# Patient Record
Sex: FEMALE | Race: WHITE | NOT HISPANIC OR LATINO | Employment: STUDENT | URBAN - METROPOLITAN AREA
[De-identification: names, ages, dates, MRNs, and addresses within clinical notes are randomized per-mention and may not be internally consistent; named-entity substitution may affect disease eponyms.]

---

## 2018-09-20 ENCOUNTER — HOSPITAL ENCOUNTER (EMERGENCY)
Facility: HOSPITAL | Age: 23
Discharge: HOME | End: 2018-09-20
Attending: EMERGENCY MEDICINE
Payer: COMMERCIAL

## 2018-09-20 VITALS
SYSTOLIC BLOOD PRESSURE: 157 MMHG | OXYGEN SATURATION: 98 % | BODY MASS INDEX: 45.31 KG/M2 | RESPIRATION RATE: 20 BRPM | HEIGHT: 61 IN | WEIGHT: 240 LBS | HEART RATE: 82 BPM | TEMPERATURE: 98 F | DIASTOLIC BLOOD PRESSURE: 74 MMHG

## 2018-09-20 DIAGNOSIS — T50.901A MEDICATION OVERDOSE, ACCIDENTAL OR UNINTENTIONAL, INITIAL ENCOUNTER: Primary | ICD-10-CM

## 2018-09-20 PROCEDURE — 99283 EMERGENCY DEPT VISIT LOW MDM: CPT

## 2018-09-20 NOTE — DISCHARGE INSTRUCTIONS
"Return to the Emergency Department of any acute worsening of your symptoms or for any other concern.     You should return to the ED for fever/chills, shortness of breath, chest pain, weakness or "passing out".     Pt should take all medications as prescribed.    Pt should follow up with PCP as soon as possible.    The risks associated with not taking your medications as prescribed and not following up with your Primary Care doctor or sub specialist includes worsening of your condition, pain, disability, loss of function or livelihood, and death      VALDO Pressley M.D. 2:36 AM 9/20/2018      Our goal in the emergency department is to always give you outstanding care and exceptional service. You may receive a survey by mail or e-mail in the next week regarding your experience in our ED. We would greatly appreciate your completing and returning the survey. Your feedback provides us with a way to recognize our staff who give very good care and it helps us learn how to improve when your experience was below our aspiration of excellence.     "

## 2018-09-20 NOTE — ED PROVIDER NOTES
"Encounter Date: 9/20/2018       History     Chief Complaint   Patient presents with    Ingestion     Pt reports "Accidentally taking her night time medications twice last night".  Seroquel 400mg, lamictal 300mg, and lithium 1200mg.  States she took the first dose about 2300, then the second dose at 0100.     Patient states that she has had recent medication changes to her Seroquel Lamictal and lithium and that she accidentally took a double dose this evening.  She does not support any symptoms other than just not knowing if this would be okay or not.  She denies any shortness of breath, wheezing, lightheadedness, dizziness, chest pain, skin rashes, giddiness or excitability.          Review of patient's allergies indicates:   Allergen Reactions    Abilify [aripiprazole]      Past Medical History:   Diagnosis Date    Mental disorder      No past surgical history on file.  Family History   Problem Relation Age of Onset    Breast cancer Maternal Aunt     Colon cancer Neg Hx     Ovarian cancer Neg Hx      Social History     Tobacco Use    Smoking status: Current Every Day Smoker     Packs/day: 0.50     Years: 4.00     Pack years: 2.00     Types: Cigarettes   Substance Use Topics    Alcohol use: Yes    Drug use: Not on file     Review of Systems   Constitutional: Negative for activity change, appetite change, chills, diaphoresis, fatigue and fever.   HENT: Negative for sore throat and trouble swallowing.    Respiratory: Negative for apnea, cough, chest tightness and shortness of breath.    Cardiovascular: Negative for chest pain and leg swelling.   Gastrointestinal: Negative for abdominal pain.   Musculoskeletal: Negative for arthralgias.   Neurological: Negative for dizziness, tremors, seizures, syncope, speech difficulty, weakness, light-headedness, numbness and headaches.   Psychiatric/Behavioral: Negative for agitation, behavioral problems, confusion, dysphoric mood, hallucinations and self-injury. The " patient is not nervous/anxious and is not hyperactive.        Physical Exam     Initial Vitals [09/20/18 0225]   BP Pulse Resp Temp SpO2   (!) 157/74 82 20 98.3 °F (36.8 °C) 98 %      MAP       --         Physical Exam    Constitutional: She appears well-developed and well-nourished. No distress.   HENT:   Head: Normocephalic and atraumatic.   Eyes: Conjunctivae and EOM are normal. Pupils are equal, round, and reactive to light.   Neck: Normal range of motion.   Cardiovascular: Regular rhythm and intact distal pulses.   Pulmonary/Chest: Breath sounds normal. No respiratory distress.   Abdominal: She exhibits no distension. There is no tenderness.   Musculoskeletal: She exhibits no edema or tenderness.   Neurological: She is alert and oriented to person, place, and time. She has normal strength.   Skin: Skin is warm and dry. Capillary refill takes less than 2 seconds.   Psychiatric: She has a normal mood and affect. Thought content normal.         ED Course   Procedures  Labs Reviewed   POCT URINE PREGNANCY          Imaging Results    None          Medical Decision Making:   History:   Old Medical Records: I decided to obtain old medical records.  Initial Assessment:   Medical decision-making:    The patient received a medical screening exam. If performed, the EKG was independently evaluated by me and is pending final cardiology evaluation.  If performed, all radiographic studies were independently evaluated by me and are pending final radiology evaluation. If labs were ordered, they were reviewed. Vital signs are independently assessed by me.  If performed, the pulse oximetry was independently evaluated by me.  I decided to obtain the patient's past medical record.  If available, I reviewed the patient's past medical record, including most recent labs and radiology reports.    ED Management:  Patient presents to the emergency department after taking an accidental extra dose of her nightly medications.  I discussed  all of the above findings with poison Control and we cross reference this with the therapeutic range values of all of her medications and and does not appear that she is at any risk for developing any toxic levels.  Patient is not showing any signs of anaphylaxis or an acute allergic reaction.  She is completely symptom free.  Advised her to hold her medications and does restart them an a day.  Return precautions were discussed.  Signs and symptoms of allergic reactions and anaphylaxis were discussed-though she is not experiencing any of these and I doubt that she will given the therapeutic range values and the doses that she took.    The results and physical exam findings were reviewed with the patient. Pt agrees with assessment, disposition and treatment plan and has no further questions or complaints at this time.    VALDO Pressley M.D. 2:40 AM 9/20/2018                        Clinical Impression:   The encounter diagnosis was Medication overdose, accidental or unintentional, initial encounter.                             Kevin Pressley MD  09/20/18 0240

## 2020-01-17 ENCOUNTER — HOSPITAL ENCOUNTER (EMERGENCY)
Facility: HOSPITAL | Age: 25
Discharge: HOME | End: 2020-01-17
Attending: EMERGENCY MEDICINE
Payer: MEDICAID

## 2020-01-17 VITALS
WEIGHT: 270 LBS | RESPIRATION RATE: 18 BRPM | BODY MASS INDEX: 50.98 KG/M2 | SYSTOLIC BLOOD PRESSURE: 138 MMHG | OXYGEN SATURATION: 96 % | TEMPERATURE: 98 F | DIASTOLIC BLOOD PRESSURE: 81 MMHG | HEART RATE: 78 BPM | HEIGHT: 61 IN

## 2020-01-17 DIAGNOSIS — F32.A DEPRESSION, UNSPECIFIED DEPRESSION TYPE: Primary | ICD-10-CM

## 2020-01-17 DIAGNOSIS — F31.9 BIPOLAR AFFECTIVE DISORDER, REMISSION STATUS UNSPECIFIED: ICD-10-CM

## 2020-01-17 DIAGNOSIS — R63.0 DECREASED APPETITE: ICD-10-CM

## 2020-01-17 PROCEDURE — 99283 EMERGENCY DEPT VISIT LOW MDM: CPT

## 2020-01-17 NOTE — ED TRIAGE NOTES
"Patient states she stopped taking her lithium in October abruptly.  Her psychiatrist was weaning her Seroquel ER from 300mg to 150mg. Due to insurance denying the medication she has only been taking 150mg daily. Patient is tearful. States she hasn't needed to be hospitalized in over 6 years. Drove herself here because she doesn't have a plan to kill herself but doesn't feel "stable."   "

## 2020-01-17 NOTE — DISCHARGE INSTRUCTIONS
Please return to the ED immediately if you have thoughts of hurting or killing yourself. Call 911. This is very important. Please stay around family member and friends this weekend. See [facility] tomorrow if possible to discuss depression and possible restarting lithium. Please see your psychiatrist as soon as possible to discuss medication management.     Thank you for coming to our Emergency Department today. It is important to remember that some problems are difficult to diagnose and may not be found during your first visit. Be sure to follow up with your primary care doctor and review any labs/imaging that was performed with them. If you do not have a primary care doctor, you may contact the one listed on your discharge paperwork or you may also call the [facility] Appointment Desk at [phone] to schedule an appointment with one.     All medications may potentially have side effects and it is impossible to predict which medications may give you side effects. If you feel that you are having a negative effect of any medication you should immediately stop taking them and seek medical attention.    Return to the ER with any questions/concerns, new/concerning symptoms, worsening or failure to improve. Do not drive or make any important decisions for 24 hours if you have received any pain medications, sedatives or mood altering drugs during your ER visit.

## 2020-01-17 NOTE — ED PROVIDER NOTES
Encounter Date: 1/17/2020       History     Chief Complaint   Patient presents with    Psychiatric Evaluation     reports stop taking lithium, reports racing throughts for past week, cyring in triage, denies HI; reports taking multiple meds, weaning herself off seroquel     23 yo female with PMHx of Bipolar, anxiety presents with depression. Patient states she recently stopped taking lithium in October and then her psychiatrist (Dr. Robledo at [facility]) decreased her seroquel from 300 mg to 150 mg to wean her off to start her on a new medication. However her insurance is not covering the new medication and she has not been able to get it filled. She tried to make appt with psych today but they were not accepting walk-ins until Tuesday. Last saw her psychiatrist on Friday but started feeling worse after that. Patient report decreased sleep over the last few days, decreased appetite, racing thoughts at night. She reports feeling more depressed. However she adamantly denies SI/HI/AVH. Current med: Wellbutrin, Lamictal, Seroquel. Denies ETOH, illicits, reports 1/3 ppd tobacco. Allergies: abilify.         Review of patient's allergies indicates:   Allergen Reactions    Abilify [aripiprazole]      Past Medical History:   Diagnosis Date    Mental disorder      History reviewed. No pertinent surgical history.  Family History   Problem Relation Age of Onset    Breast cancer Maternal Aunt     Colon cancer Neg Hx     Ovarian cancer Neg Hx      Social History     Tobacco Use    Smoking status: Current Every Day Smoker     Packs/day: 0.25     Years: 4.00     Pack years: 1.00     Types: Cigarettes    Tobacco comment: 2 packs per week   Substance Use Topics    Alcohol use: Not Currently    Drug use: Not Currently     Review of Systems   Constitutional: Positive for fatigue. Negative for chills, diaphoresis and fever.   Eyes: Negative for photophobia and visual disturbance.   Respiratory: Negative for cough and  shortness of breath.    Cardiovascular: Negative for chest pain and leg swelling.   Gastrointestinal: Negative for abdominal pain, blood in stool, constipation, diarrhea, nausea and vomiting.   Genitourinary: Negative for dysuria, flank pain, frequency, hematuria and urgency.   Musculoskeletal: Negative for neck pain and neck stiffness.   Skin: Negative for rash and wound.   Neurological: Negative for weakness, light-headedness, numbness and headaches.   Psychiatric/Behavioral: Positive for dysphoric mood and sleep disturbance. Negative for agitation, behavioral problems, confusion, hallucinations, self-injury and suicidal ideas.   All other systems reviewed and are negative.      Physical Exam     Initial Vitals   BP Pulse Resp Temp SpO2   01/17/20 1530 01/17/20 1529 01/17/20 1530 01/17/20 1530 01/17/20 1530   (!) 172/83 110 16 98.8 °F (37.1 °C) 95 %      MAP       --                Physical Exam    Nursing note and vitals reviewed.  Constitutional: She appears well-developed and well-nourished. She is not diaphoretic. No distress.   HENT:   Head: Normocephalic and atraumatic.   Mouth/Throat: Oropharynx is clear and moist. No oropharyngeal exudate.   Eyes: Conjunctivae and EOM are normal. Pupils are equal, round, and reactive to light. Right eye exhibits no discharge. Left eye exhibits no discharge.   Neck: Normal range of motion. Neck supple. No JVD present.   Cardiovascular: Normal rate, regular rhythm, normal heart sounds and intact distal pulses. Exam reveals no gallop and no friction rub.    No murmur heard.  Pulmonary/Chest: Breath sounds normal. No respiratory distress. She has no wheezes. She has no rhonchi. She has no rales.   Abdominal: Soft. Bowel sounds are normal. She exhibits no distension. There is no tenderness. There is no rebound and no guarding.   Musculoskeletal: She exhibits no edema or tenderness.   Lymphadenopathy:     She has no cervical adenopathy.   Neurological: She is alert and oriented  to person, place, and time. No cranial nerve deficit or sensory deficit. GCS score is 15. GCS eye subscore is 4. GCS verbal subscore is 5. GCS motor subscore is 6.   Skin: Skin is warm and dry. Capillary refill takes less than 2 seconds.   Psychiatric: Her behavior is normal. Judgment and thought content normal.   Slightly tearful         ED Course   Procedures  Labs Reviewed - No data to display       Imaging Results    None     MDM  23 yo female presents to ED for increased feelings of depression over the last few days. Denies SI, HI, AVH. Patient is not gravely disabled and is meeting all her ADLS.Patient does not meet PEC criteria. She appears goal oriented and able to have thoughtful conversation about options to improve mood. Discussed with patient options including lab work to look for medical cause, hydroxyzine for anxiety and sleep, voluntary admission to psychiatric facility. Patient states she will call to get appt with psych on Tuesday and try to see [facility] tomorrow. She is going to stay with her sister this morning has a safety plan to return to the ED if she develops any thoughts of hurting herself. She reports recently having labs and thyroid checked and does not think it is a metabolic cause. Discussed with her meditation, keeping socially engaged, exercise, and other nonpharmacologic options for mood improvement. Patient in agreement with plan. Offered to talk to family to discuss patient case, however she is declining at this time as she states they will just give her more anxiety. All questions answered and patient in agreement with plan.                                      Clinical Impression:       ICD-10-CM ICD-9-CM   1. Depression, unspecified depression type F32.9 311   2. Bipolar affective disorder, remission status unspecified F31.9 296.80   3. Decreased appetite R63.0 783.0                             Milagros Chiang MD  01/17/20 8193

## 2020-04-05 ENCOUNTER — HOSPITAL ENCOUNTER (EMERGENCY)
Facility: HOSPITAL | Age: 25
Discharge: HOME | End: 2020-04-05
Attending: EMERGENCY MEDICINE
Payer: MEDICAID

## 2020-04-05 VITALS
OXYGEN SATURATION: 96 % | HEART RATE: 90 BPM | SYSTOLIC BLOOD PRESSURE: 131 MMHG | DIASTOLIC BLOOD PRESSURE: 79 MMHG | WEIGHT: 280 LBS | BODY MASS INDEX: 52.91 KG/M2 | RESPIRATION RATE: 18 BRPM | TEMPERATURE: 99 F

## 2020-04-05 DIAGNOSIS — R05.9 COUGH: ICD-10-CM

## 2020-04-05 DIAGNOSIS — J06.9 VIRAL URI WITH COUGH: Primary | ICD-10-CM

## 2020-04-05 LAB
B-HCG UR QL: NEGATIVE
CTP QC/QA: YES
CTP QC/QA: YES
POC MOLECULAR INFLUENZA A AGN: NEGATIVE
POC MOLECULAR INFLUENZA B AGN: NEGATIVE
SARS-COV-2 RNA AMPLIFICATION, QUAL: NEGATIVE

## 2020-04-05 PROCEDURE — 87502 INFLUENZA DNA AMP PROBE: CPT

## 2020-04-05 PROCEDURE — 99284 EMERGENCY DEPT VISIT MOD MDM: CPT | Mod: 25

## 2020-04-05 PROCEDURE — 81025 URINE PREGNANCY TEST: CPT | Performed by: PHYSICIAN ASSISTANT

## 2020-04-05 PROCEDURE — U0002 COVID-19 LAB TEST NON-CDC: HCPCS

## 2020-04-05 PROCEDURE — 25000003 PHARM REV CODE 250: Performed by: PHYSICIAN ASSISTANT

## 2020-04-05 RX ORDER — ALBUTEROL SULFATE 90 UG/1
1-2 AEROSOL, METERED RESPIRATORY (INHALATION) EVERY 6 HOURS PRN
Qty: 18 G | Refills: 0 | Status: SHIPPED | OUTPATIENT
Start: 2020-04-05 | End: 2020-05-05

## 2020-04-05 RX ORDER — BENZONATATE 100 MG/1
100 CAPSULE ORAL 3 TIMES DAILY PRN
Qty: 20 CAPSULE | Refills: 0 | Status: SHIPPED | OUTPATIENT
Start: 2020-04-05 | End: 2020-04-12

## 2020-04-05 RX ORDER — IBUPROFEN 600 MG/1
600 TABLET ORAL EVERY 6 HOURS PRN
Qty: 20 TABLET | Refills: 0 | Status: SHIPPED | OUTPATIENT
Start: 2020-04-05 | End: 2020-04-10

## 2020-04-05 RX ORDER — ACETAMINOPHEN 500 MG
500 TABLET ORAL
Status: COMPLETED | OUTPATIENT
Start: 2020-04-05 | End: 2020-04-05

## 2020-04-05 RX ORDER — ACETAMINOPHEN 500 MG
500 TABLET ORAL EVERY 4 HOURS PRN
Qty: 20 TABLET | Refills: 0 | Status: SHIPPED | OUTPATIENT
Start: 2020-04-05 | End: 2020-04-10

## 2020-04-05 RX ORDER — BENZONATATE 100 MG/1
100 CAPSULE ORAL
Status: COMPLETED | OUTPATIENT
Start: 2020-04-05 | End: 2020-04-05

## 2020-04-05 RX ADMIN — ACETAMINOPHEN 500 MG: 500 TABLET ORAL at 04:04

## 2020-04-05 RX ADMIN — BENZONATATE 100 MG: 100 CAPSULE ORAL at 04:04

## 2020-04-05 NOTE — ED PROVIDER NOTES
Encounter Date: 4/5/2020       History     Chief Complaint   Patient presents with    Cough     c/o cough x a week, was seen by tele health and presumtive positve for COVID. pt denies fever. Pt endorses diarrhea. pt states streaks of blood in sputum.      CC:Cough    HPI:   25 y/o female 4 day history of dry cough, subjective fever, chills. She reports associated intermittent SOB for the past 4 days worse in the evenings and with waking in the morning, worse with exertion.  Began seeing specks of blood in sputum today. Sister is presumptive positive for COVID-19 who she was in contact with twice this week. She was seen by telehealth and was referred to get a test but was unable to at that time because she didn't have a fever. Was sent here by  for testing. Pt has had diarrhea 3-4 x/day for the past 6 days. Had HA and neck pain 4 days ago that resolved. Denies nasal congestion, rhinorrhea, nausea, vomiting, abdominal pain, dysuria, urinary frequency or urgency, melena, hematochezia, lower extremity swelling, recent travel trauma surgery, history of DVT or PE, hemoptysis, history of cancer. Attempted tx with Mucinex and Tylenol         Review of patient's allergies indicates:   Allergen Reactions    Abilify [aripiprazole]      Past Medical History:   Diagnosis Date    Mental disorder      History reviewed. No pertinent surgical history.  Family History   Problem Relation Age of Onset    Breast cancer Maternal Aunt     Colon cancer Neg Hx     Ovarian cancer Neg Hx      Social History     Tobacco Use    Smoking status: Current Every Day Smoker     Packs/day: 0.25     Years: 4.00     Pack years: 1.00     Types: Cigarettes    Tobacco comment: 2 packs per week   Substance Use Topics    Alcohol use: Not Currently    Drug use: Not Currently     Review of Systems   Constitutional: Positive for chills and fever.   HENT: Negative for congestion, ear pain, rhinorrhea and sore throat.    Eyes: Negative for redness.    Respiratory: Positive for cough and shortness of breath.    Cardiovascular: Negative for chest pain.   Gastrointestinal: Positive for diarrhea. Negative for abdominal pain, nausea and vomiting.   Genitourinary: Negative for dysuria, frequency and urgency.   Musculoskeletal: Positive for neck pain. Negative for back pain and neck stiffness.   Skin: Negative for rash.   Neurological: Positive for headaches. Negative for speech difficulty.   Psychiatric/Behavioral: Negative for confusion.       Physical Exam     Initial Vitals [04/05/20 1538]   BP Pulse Resp Temp SpO2   138/81 90 19 98.4 °F (36.9 °C) 96 %      MAP       --         Physical Exam    Nursing note and vitals reviewed.  Constitutional: She appears well-developed and well-nourished.   HENT:   Head: Normocephalic.   Right Ear: External ear normal.   Left Ear: External ear normal.   Nose: Nose normal.   Eyes: Conjunctivae are normal.   Cardiovascular: Normal rate and regular rhythm. Exam reveals no gallop and no friction rub.    No murmur heard.  Pulmonary/Chest: Breath sounds normal. She has no wheezes. She has no rhonchi. She has no rales.   Abdominal: Soft. Bowel sounds are normal. She exhibits no distension. There is no tenderness. There is no rebound, no guarding, no tenderness at McBurney's point and negative Shine's sign.   Musculoskeletal: Normal range of motion.   Neurological: She is alert. She has normal strength. No cranial nerve deficit or sensory deficit.   Skin: Skin is warm and dry.   No lower extremity pain or swelling   Psychiatric: She has a normal mood and affect.         ED Course   Procedures  Labs Reviewed   SARS-COV-2 RNA AMPLIFICATION, QUAL   POCT URINE PREGNANCY   POCT INFLUENZA A/B MOLECULAR          Imaging Results          X-Ray Chest AP Portable (Final result)  Result time 04/05/20 16:24:31    Final result by Arsen Mac MD (04/05/20 16:24:31)                 Impression:      1. No acute cardiopulmonary  process.      Electronically signed by: Arsen Mac MD  Date:    04/05/2020  Time:    16:24             Narrative:    EXAMINATION:  XR CHEST AP PORTABLE    CLINICAL HISTORY:  Cough    TECHNIQUE:  Single frontal view of the chest was performed.    COMPARISON:  None    FINDINGS:  The cardiomediastinal silhouette is not enlarged.  There is no pleural effusion.  The trachea is midline.  The lungs are symmetrically expanded bilaterally without evidence of acute parenchymal process. No large focal consolidation seen.  There is no pneumothorax.  The osseous structures are unremarkable.                                 Medical Decision Making:   Initial Assessment:   [age] year female no pertinent past medical history presenting for evaluation of dry cough with associated shortness of breath for the past 4 days.  Patient reports associated diarrhea for the past 6 days.  Has been in contact with someone who tested positive for COVID19.  Pt is afebrile, nontoxic appearing in no distress. Exam above.  UPT negative.  Influenza and COVID swabs are negative.  Chest x-ray negative for PNA pneumothorax acute abnormality.  She is perc negative. Think this is viral syndrome. Will discharge with meds for symptomatic treatment and instructed to use humidifier. Follow up with primary care. Return to ER for worsening symptoms or as needed    Additional MDM:     Well's Criteria Score:  -Clinical symptoms of DVT (leg swelling, pain with palpation) = 0.0  -Other diagnosis less likely than pulmonary embolism =            0.0  -Heart Rate >100 =   0.0  -Immobilization (= or > than 3 days) or surgery in the previous 4 weeks = 0.0  -Previous DVT/PE = 0.0  -Hemoptysis =          0.0                                      Clinical Impression:       ICD-10-CM ICD-9-CM   1. Viral URI with cough J06.9 465.9    B97.89    2. Cough R05 786.2             ED Disposition Condition    Discharge Stable        ED Prescriptions     Medication Sig  Dispense Start Date End Date Auth. Provider    ibuprofen (ADVIL,MOTRIN) 600 MG tablet Take 1 tablet (600 mg total) by mouth every 6 (six) hours as needed for Pain. 20 tablet 4/5/2020 4/10/2020 Sofía Askew PA-C    acetaminophen (TYLENOL) 500 MG tablet Take 1 tablet (500 mg total) by mouth every 4 (four) hours as needed. 20 tablet 4/5/2020 4/10/2020 Sofía Askew PA-C    benzonatate (TESSALON) 100 MG capsule Take 1 capsule (100 mg total) by mouth 3 (three) times daily as needed for Cough. 20 capsule 4/5/2020 4/12/2020 Sofía Askew PA-C    albuterol (PROVENTIL/VENTOLIN HFA) 90 mcg/actuation inhaler Inhale 1-2 puffs into the lungs every 6 (six) hours as needed for Shortness of Breath. Rescue 18 g 4/5/2020 5/5/2020 Sofía Askew PA-C        Follow-up Information     Follow up With Specialties Details Why Contact [facility]  Schedule an appointment as soon as possible for a visit   [address]  [place] [address]  [phone]      [facility]  Schedule an appointment as soon as possible for a visit   [address]  [facility] [address]  [phone]      Approved Provider By Your Insurance Company  Schedule an appointment as soon as possible for a visit in 2 days Call number on your insurance card to receive a full list of providers in your area     [facility] Emergency Medicine Go to  If symptoms worsen, As needed [address] [address] [phone]                                     Sofía Askew PA-C  04/05/20 2365

## 2020-04-05 NOTE — DISCHARGE INSTRUCTIONS
Take Ibuprofen and Tylenol for fever and pain. Take Tesaslon for cough. Use inhaler as needed for SOB. Follow up with primary care . Return to ER for worsening symptoms or as needed.

## 2020-04-09 ENCOUNTER — HOSPITAL ENCOUNTER (EMERGENCY)
Facility: HOSPITAL | Age: 25
Discharge: HOME | End: 2020-04-09
Attending: EMERGENCY MEDICINE
Payer: MEDICAID

## 2020-04-09 VITALS
RESPIRATION RATE: 18 BRPM | HEIGHT: 61 IN | DIASTOLIC BLOOD PRESSURE: 71 MMHG | TEMPERATURE: 99 F | WEIGHT: 275 LBS | OXYGEN SATURATION: 98 % | SYSTOLIC BLOOD PRESSURE: 132 MMHG | HEART RATE: 82 BPM | BODY MASS INDEX: 51.92 KG/M2

## 2020-04-09 DIAGNOSIS — R04.2 HEMOPTYSIS: Primary | ICD-10-CM

## 2020-04-09 LAB
ANION GAP SERPL CALC-SCNC: 9 MMOL/L (ref 8–16)
B-HCG UR QL: NEGATIVE
BASOPHILS # BLD AUTO: 0.04 K/UL (ref 0–0.2)
BASOPHILS NFR BLD: 0.4 % (ref 0–1.9)
BUN SERPL-MCNC: 7 MG/DL (ref 6–20)
CALCIUM SERPL-MCNC: 9.5 MG/DL (ref 8.7–10.5)
CHLORIDE SERPL-SCNC: 106 MMOL/L (ref 95–110)
CO2 SERPL-SCNC: 24 MMOL/L (ref 23–29)
CREAT SERPL-MCNC: 0.9 MG/DL (ref 0.5–1.4)
CTP QC/QA: YES
DIFFERENTIAL METHOD: NORMAL
EOSINOPHIL # BLD AUTO: 0.2 K/UL (ref 0–0.5)
EOSINOPHIL NFR BLD: 2.4 % (ref 0–8)
ERYTHROCYTE [DISTWIDTH] IN BLOOD BY AUTOMATED COUNT: 13.2 % (ref 11.5–14.5)
EST. GFR  (AFRICAN AMERICAN): >60 ML/MIN/1.73 M^2
EST. GFR  (NON AFRICAN AMERICAN): >60 ML/MIN/1.73 M^2
GLUCOSE SERPL-MCNC: 112 MG/DL (ref 70–110)
HCT VFR BLD AUTO: 44.9 % (ref 37–48.5)
HGB BLD-MCNC: 14.6 G/DL (ref 12–16)
IMM GRANULOCYTES # BLD AUTO: 0.04 K/UL (ref 0–0.04)
IMM GRANULOCYTES NFR BLD AUTO: 0.4 % (ref 0–0.5)
INR PPP: 0.9 (ref 0.8–1.2)
LYMPHOCYTES # BLD AUTO: 3.7 K/UL (ref 1–4.8)
LYMPHOCYTES NFR BLD: 37.4 % (ref 18–48)
MCH RBC QN AUTO: 27.6 PG (ref 27–31)
MCHC RBC AUTO-ENTMCNC: 32.5 G/DL (ref 32–36)
MCV RBC AUTO: 85 FL (ref 82–98)
MONOCYTES # BLD AUTO: 0.4 K/UL (ref 0.3–1)
MONOCYTES NFR BLD: 4.1 % (ref 4–15)
NEUTROPHILS # BLD AUTO: 5.5 K/UL (ref 1.8–7.7)
NEUTROPHILS NFR BLD: 55.3 % (ref 38–73)
NRBC BLD-RTO: 0 /100 WBC
PLATELET # BLD AUTO: 290 K/UL (ref 150–350)
PMV BLD AUTO: 9.5 FL (ref 9.2–12.9)
POTASSIUM SERPL-SCNC: 3.8 MMOL/L (ref 3.5–5.1)
PROTHROMBIN TIME: 10.3 SEC (ref 9–12.5)
RBC # BLD AUTO: 5.29 M/UL (ref 4–5.4)
SODIUM SERPL-SCNC: 139 MMOL/L (ref 136–145)
WBC # BLD AUTO: 9.87 K/UL (ref 3.9–12.7)

## 2020-04-09 PROCEDURE — 85025 COMPLETE CBC W/AUTO DIFF WBC: CPT

## 2020-04-09 PROCEDURE — 99284 EMERGENCY DEPT VISIT MOD MDM: CPT | Mod: 25

## 2020-04-09 PROCEDURE — 85610 PROTHROMBIN TIME: CPT

## 2020-04-09 PROCEDURE — 81025 URINE PREGNANCY TEST: CPT | Performed by: PHYSICIAN ASSISTANT

## 2020-04-09 PROCEDURE — 80048 BASIC METABOLIC PNL TOTAL CA: CPT

## 2020-04-10 NOTE — DISCHARGE INSTRUCTIONS
Follow-up with your primary care provider for reevaluation of blood in sputum. Begin pepcid/famotidine twice daily.  Drink lots of fluids, stay well hydrated.    Immediately return to this emergency department if you begin with shortness of breath or chest pain, if you begin with uncontrolled fever, if the bleeding worsens, if you begin with lightheadedness dizziness, if any other problems occur.

## 2020-04-10 NOTE — ED TRIAGE NOTES
Pt presents to ED c/o cough for past couple weeks. Pt states she started coughing up streaks of blood approx one week ago.

## 2020-04-10 NOTE — ED PROVIDER NOTES
Encounter Date: 4/9/2020       History     Chief Complaint   Patient presents with    Hemoptysis     Seen here in this ED this past Sunday for same symptoms. Reports COVID screening negative. Pt reports she is still coughing up streaks of blood.     Cough     24-year-old female history of anxiety/depression, bipolar disorder, presents to ED with chief complaint infrequent streaks of bright hemoptysis x1.5 weeks.  She admits to cough x2 weeks.  She was recently seen in this ED on 04/05/2020 due to cough, shortness of breath.  COVID swab negative.  Patient states majority of her symptoms have improved.  She admits to very infrequent productive cough at this time.  She states today she coughed and noticed some streaks in her sputum.  She states she does not notice blood every time she coughs.  She denies fever.  She denies shortness of breath.  She denies dyspnea on exertion.  No lightheadedness or dizziness.  No history of anemia.  No fatigue or weakness.    She denies calf pain or leg swelling.  She is currently on OCP. No hx DVT/PE. No hematemesis, no melena or BRBPR. No hx platelet dysfunction. No antiplatelets or anticoagulation.    She does admit to mild, intermittent R midaxillary chest pain when lying supine at night. No pain throughout the day, no pain with deep inspiration, no pain with cough, no pain with movement or palpation.        Review of patient's allergies indicates:   Allergen Reactions    Abilify [aripiprazole]      Past Medical History:   Diagnosis Date    Mental disorder      History reviewed. No pertinent surgical history.  Family History   Problem Relation Age of Onset    Breast cancer Maternal Aunt     Colon cancer Neg Hx     Ovarian cancer Neg Hx      Social History     Tobacco Use    Smoking status: Current Every Day Smoker     Packs/day: 0.25     Years: 4.00     Pack years: 1.00     Types: Cigarettes    Smokeless tobacco: Never Used    Tobacco comment: 2 packs per week   Substance  Use Topics    Alcohol use: Not Currently    Drug use: Not Currently     Review of Systems   Constitutional: Negative for activity change, appetite change, chills and fever.   Respiratory: Positive for cough. Negative for chest tightness, shortness of breath and wheezing.         Hemoptysis   Cardiovascular: Negative for palpitations and leg swelling.        R midaxillary chest pain   Gastrointestinal: Negative for abdominal pain, nausea and vomiting.   Genitourinary: Negative for hematuria and vaginal bleeding.   Musculoskeletal: Negative for arthralgias.   Neurological: Negative for dizziness and light-headedness.       Physical Exam     Initial Vitals [04/09/20 1917]   BP Pulse Resp Temp SpO2   127/88 107 20 98.5 °F (36.9 °C) 97 %      MAP       --         Physical Exam    Nursing note and vitals reviewed.  Constitutional: She appears well-developed and well-nourished. She is not diaphoretic. No distress.   Overall well-appearing nontoxic.  Resting comfortably on exam table.  Speaking in full sentences without pause or difficulty.   HENT:   Head: Normocephalic and atraumatic.   Eyes: EOM are normal.   Neck: Normal range of motion. Neck supple.   Cardiovascular: Intact distal pulses.   Sinus tachycardia   Pulmonary/Chest: Breath sounds normal. No respiratory distress. She has no wheezes. She has no rhonchi. She exhibits no tenderness.   Neurological: She is alert and oriented to person, place, and time.   Skin: Skin is warm.   Psychiatric: She has a normal mood and affect. Thought content normal.         ED Course   Procedures  Labs Reviewed   BASIC METABOLIC PANEL - Abnormal; Notable for the following components:       Result Value    Glucose 112 (*)     All other components within normal limits   CBC W/ AUTO DIFFERENTIAL   PROTIME-INR   POCT URINE PREGNANCY          Imaging Results          X-Ray Chest 1 View (Final result)  Result time 04/09/20 19:41:53    Final result by Castro Louis MD (04/09/20  19:41:53)                 Impression:      No acute cardiopulmonary process identified.      Electronically signed by: Castro Louis MD  Date:    04/09/2020  Time:    19:41             Narrative:    EXAMINATION:  XR CHEST 1 VIEW    CLINICAL HISTORY:  Hemoptysis    TECHNIQUE:  Single frontal view of the chest was performed.    COMPARISON:  04/05/2020.    FINDINGS:  Cardiac silhouette is normal in size.  Lungs are symmetrically expanded.  No evidence of focal consolidative process, pneumothorax, or significant effusion.  No acute osseous abnormality identified.                              X-Rays:   Independently Interpreted Readings:   Chest X-Ray: No consolidation, effusion, pneumothorax.  Very mild increase in right-sided hilar vascularity.     Medical Decision Making:   Differential Diagnosis:   Bronchitis, bleeding disorder, anxiety, malignancy  Clinical Tests:   Lab Tests: Ordered and Reviewed  Radiological Study: Ordered and Reviewed  ED Management:  Hemoptysis is minimal, infrequent. No lightheadedness/dizziness, no hx anemia, no bleeding disorders. She is a smoker. +fibrocystic breast disease. No strong family hx malignancy. No SOB. No history of thrombophilia.  No recent surgery.  No major trauma. No recent immobility.  No active cancer. Patient is not pregnant.  This is not following the immediate postpartum interval if patient has been pregnant.  Patient is less than 50 years old.  No history of percutaneous indwelling catheter. No previous DVT/PE. She has presented to the ED with heart rate from the 90 to 110 in the past.  This may be patient norm. Despite exogenous estrogen, I do not suspect PE as culprit of infrequent, small volume hemoptysis at this time.    Basic labs reassuring; H and H within normal limits, normal platelet count, normal PT/INR, BMP without any disturbances. CXR without concerning findings or acute change from previous. I do not feel need for chest CT or any more imaging at this  time.     Will d/c with instructions to f/u with PCP for reevaluation of hemoptysis. I've asked her to return to this ED if volume increases, if she begins with signs of anemia, if she begins with SOB or worsening tachycardiac, if any other problems occur.                                  Clinical Impression:       ICD-10-CM ICD-9-CM   1. Hemoptysis R04.2 786.30         Disposition:   Disposition: Discharged  Condition: Stable     ED Disposition Condition    Discharge Stable        ED Prescriptions     None        Follow-up Information     Follow up With Specialties Details Why Contact Info    [facility] [place]  Schedule an appointment as soon as possible for a visit  for reevaluation [address] [place] [address]  [phone]      [facility]  As needed, If symptoms worsen [address] [facility] [address]  [phone]                                     Christian Gómez PA-C  04/09/20 2034

## 2023-04-19 ENCOUNTER — HOSPITAL ENCOUNTER (INPATIENT)
Facility: HOSPITAL | Age: 28
LOS: 2 days | Discharge: HOME | DRG: 163 | End: 2023-04-21
Attending: EMERGENCY MEDICINE | Admitting: STUDENT IN AN ORGANIZED HEALTH CARE EDUCATION/TRAINING PROGRAM
Payer: MEDICAID

## 2023-04-19 DIAGNOSIS — R06.02 SHORTNESS OF BREATH: ICD-10-CM

## 2023-04-19 DIAGNOSIS — R07.9 CHEST PAIN: ICD-10-CM

## 2023-04-19 DIAGNOSIS — I26.99 PULMONARY EMBOLI: Primary | ICD-10-CM

## 2023-04-19 DIAGNOSIS — R09.02 HYPOXIA: ICD-10-CM

## 2023-04-19 DIAGNOSIS — I26.99 PULMONARY EMBOLISM: ICD-10-CM

## 2023-04-19 PROBLEM — E66.01 SEVERE OBESITY (BMI >= 40): Status: ACTIVE | Noted: 2023-04-19

## 2023-04-19 PROBLEM — J96.01 ACUTE HYPOXEMIC RESPIRATORY FAILURE: Status: ACTIVE | Noted: 2023-04-19

## 2023-04-19 PROBLEM — T38.4X5A ADVERSE REACTION TO BIRTH CONTROL PILLS: Status: ACTIVE | Noted: 2023-04-19

## 2023-04-19 LAB
ALBUMIN SERPL BCP-MCNC: 3.4 G/DL (ref 3.5–5.2)
ALLENS TEST: ABNORMAL
ALP SERPL-CCNC: 80 U/L (ref 55–135)
ALT SERPL W/O P-5'-P-CCNC: 20 U/L (ref 10–44)
AMPHET+METHAMPHET UR QL: NEGATIVE
ANION GAP SERPL CALC-SCNC: 8 MMOL/L (ref 8–16)
APTT PPP: 26.5 SEC (ref 21–32)
APTT PPP: 86.2 SEC (ref 21–32)
AST SERPL-CCNC: 20 U/L (ref 10–40)
B-HCG UR QL: NEGATIVE
BARBITURATES UR QL SCN>200 NG/ML: NEGATIVE
BASOPHILS # BLD AUTO: 0.05 K/UL (ref 0–0.2)
BASOPHILS NFR BLD: 0.5 % (ref 0–1.9)
BENZODIAZ UR QL SCN>200 NG/ML: NEGATIVE
BILIRUB SERPL-MCNC: 0.3 MG/DL (ref 0.1–1)
BILIRUB UR QL STRIP: NEGATIVE
BNP SERPL-MCNC: 91 PG/ML (ref 0–99)
BUN SERPL-MCNC: 8 MG/DL (ref 6–20)
BZE UR QL SCN: NEGATIVE
CALCIUM SERPL-MCNC: 9.9 MG/DL (ref 8.7–10.5)
CANNABINOIDS UR QL SCN: NEGATIVE
CHLORIDE SERPL-SCNC: 109 MMOL/L (ref 95–110)
CLARITY UR: CLEAR
CO2 SERPL-SCNC: 22 MMOL/L (ref 23–29)
COLOR UR: COLORLESS
CREAT SERPL-MCNC: 0.9 MG/DL (ref 0.5–1.4)
CREAT UR-MCNC: 41.7 MG/DL (ref 15–325)
CTP QC/QA: YES
DIFFERENTIAL METHOD: ABNORMAL
EOSINOPHIL # BLD AUTO: 0.2 K/UL (ref 0–0.5)
EOSINOPHIL NFR BLD: 1.6 % (ref 0–8)
ERYTHROCYTE [DISTWIDTH] IN BLOOD BY AUTOMATED COUNT: 14 % (ref 11.5–14.5)
EST. GFR  (NO RACE VARIABLE): >60 ML/MIN/1.73 M^2
ETHANOL SERPL-MCNC: <10 MG/DL
GLUCOSE SERPL-MCNC: 113 MG/DL (ref 70–110)
GLUCOSE UR QL STRIP: NEGATIVE
HCT VFR BLD AUTO: 41.5 % (ref 37–48.5)
HGB BLD-MCNC: 14 G/DL (ref 12–16)
HGB UR QL STRIP: NEGATIVE
IMM GRANULOCYTES # BLD AUTO: 0.03 K/UL (ref 0–0.04)
IMM GRANULOCYTES NFR BLD AUTO: 0.3 % (ref 0–0.5)
INR PPP: 1 (ref 0.8–1.2)
KETONES UR QL STRIP: NEGATIVE
LEUKOCYTE ESTERASE UR QL STRIP: NEGATIVE
LYMPHOCYTES # BLD AUTO: 2.7 K/UL (ref 1–4.8)
LYMPHOCYTES NFR BLD: 29.1 % (ref 18–48)
MCH RBC QN AUTO: 27 PG (ref 27–31)
MCHC RBC AUTO-ENTMCNC: 33.7 G/DL (ref 32–36)
MCV RBC AUTO: 80 FL (ref 82–98)
METHADONE UR QL SCN>300 NG/ML: NEGATIVE
MONOCYTES # BLD AUTO: 0.6 K/UL (ref 0.3–1)
MONOCYTES NFR BLD: 6.1 % (ref 4–15)
NEUTROPHILS # BLD AUTO: 5.9 K/UL (ref 1.8–7.7)
NEUTROPHILS NFR BLD: 62.4 % (ref 38–73)
NITRITE UR QL STRIP: NEGATIVE
NRBC BLD-RTO: 0 /100 WBC
OPIATES UR QL SCN: NEGATIVE
PCP UR QL SCN>25 NG/ML: NEGATIVE
PH UR STRIP: 7 [PH] (ref 5–8)
PLATELET # BLD AUTO: 176 K/UL (ref 150–450)
PMV BLD AUTO: 11.2 FL (ref 9.2–12.9)
POC ACTIVATED CLOTTING TIME K: 179 SEC (ref 74–137)
POC ACTIVATED CLOTTING TIME K: 245 SEC (ref 74–137)
POC ACTIVATED CLOTTING TIME K: 287 SEC (ref 74–137)
POC MOLECULAR INFLUENZA A AGN: NEGATIVE
POC MOLECULAR INFLUENZA B AGN: NEGATIVE
POTASSIUM SERPL-SCNC: 3.9 MMOL/L (ref 3.5–5.1)
PROT SERPL-MCNC: 6.8 G/DL (ref 6–8.4)
PROT UR QL STRIP: NEGATIVE
PROTHROMBIN TIME: 10.3 SEC (ref 9–12.5)
RBC # BLD AUTO: 5.19 M/UL (ref 4–5.4)
SAMPLE: ABNORMAL
SARS-COV-2 RDRP RESP QL NAA+PROBE: NEGATIVE
SITE: ABNORMAL
SODIUM SERPL-SCNC: 139 MMOL/L (ref 136–145)
SP GR UR STRIP: 1 (ref 1–1.03)
TOXICOLOGY INFORMATION: NORMAL
TSH SERPL DL<=0.005 MIU/L-ACNC: 1.68 UIU/ML (ref 0.4–4)
URN SPEC COLLECT METH UR: ABNORMAL
UROBILINOGEN UR STRIP-ACNC: NEGATIVE EU/DL
WBC # BLD AUTO: 9.42 K/UL (ref 3.9–12.7)

## 2023-04-19 PROCEDURE — 85730 THROMBOPLASTIN TIME PARTIAL: CPT | Mod: 91 | Performed by: HOSPITALIST

## 2023-04-19 PROCEDURE — C1757 CATH, THROMBECTOMY/EMBOLECT: HCPCS | Performed by: INTERNAL MEDICINE

## 2023-04-19 PROCEDURE — 99291 PR CRITICAL CARE, E/M 30-74 MINUTES: ICD-10-PCS | Mod: 25,,, | Performed by: INTERNAL MEDICINE

## 2023-04-19 PROCEDURE — 83880 ASSAY OF NATRIURETIC PEPTIDE: CPT | Performed by: EMERGENCY MEDICINE

## 2023-04-19 PROCEDURE — 25000003 PHARM REV CODE 250: Performed by: INTERNAL MEDICINE

## 2023-04-19 PROCEDURE — 99153 MOD SED SAME PHYS/QHP EA: CPT | Performed by: INTERNAL MEDICINE

## 2023-04-19 PROCEDURE — 25000003 PHARM REV CODE 250: Performed by: EMERGENCY MEDICINE

## 2023-04-19 PROCEDURE — C1894 INTRO/SHEATH, NON-LASER: HCPCS | Performed by: INTERNAL MEDICINE

## 2023-04-19 PROCEDURE — 99152 MOD SED SAME PHYS/QHP 5/>YRS: CPT | Performed by: INTERNAL MEDICINE

## 2023-04-19 PROCEDURE — 36015 PLACE CATHETER IN ARTERY: CPT | Mod: 50,51,, | Performed by: INTERNAL MEDICINE

## 2023-04-19 PROCEDURE — 99152 MOD SED SAME PHYS/QHP 5/>YRS: CPT | Mod: ,,, | Performed by: INTERNAL MEDICINE

## 2023-04-19 PROCEDURE — 99292 CRITICAL CARE ADDL 30 MIN: CPT | Mod: 25

## 2023-04-19 PROCEDURE — 85730 THROMBOPLASTIN TIME PARTIAL: CPT | Performed by: EMERGENCY MEDICINE

## 2023-04-19 PROCEDURE — 99291 CRITICAL CARE FIRST HOUR: CPT | Mod: 25,,, | Performed by: INTERNAL MEDICINE

## 2023-04-19 PROCEDURE — 93005 ELECTROCARDIOGRAM TRACING: CPT

## 2023-04-19 PROCEDURE — 96374 THER/PROPH/DIAG INJ IV PUSH: CPT | Mod: 59

## 2023-04-19 PROCEDURE — 80053 COMPREHEN METABOLIC PANEL: CPT | Performed by: EMERGENCY MEDICINE

## 2023-04-19 PROCEDURE — 82077 ASSAY SPEC XCP UR&BREATH IA: CPT | Performed by: EMERGENCY MEDICINE

## 2023-04-19 PROCEDURE — 75743 ARTERY X-RAYS LUNGS: CPT | Mod: 26,59,, | Performed by: INTERNAL MEDICINE

## 2023-04-19 PROCEDURE — 20000000 HC ICU ROOM

## 2023-04-19 PROCEDURE — 37184 PRIM ART M-THRMBC 1ST VSL: CPT | Mod: 50,,, | Performed by: INTERNAL MEDICINE

## 2023-04-19 PROCEDURE — 36015 PR PLACE CATH IN SUBSEGMT PULM ART: ICD-10-PCS | Mod: 50,51,, | Performed by: INTERNAL MEDICINE

## 2023-04-19 PROCEDURE — 93010 EKG 12-LEAD: ICD-10-PCS | Mod: ,,, | Performed by: INTERNAL MEDICINE

## 2023-04-19 PROCEDURE — 85025 COMPLETE CBC W/AUTO DIFF WBC: CPT | Performed by: EMERGENCY MEDICINE

## 2023-04-19 PROCEDURE — 37184 PR THROMBECTOMY, NON CORONARY, 1ST VESSEL: ICD-10-PCS | Mod: 50,,, | Performed by: INTERNAL MEDICINE

## 2023-04-19 PROCEDURE — 84443 ASSAY THYROID STIM HORMONE: CPT | Performed by: EMERGENCY MEDICINE

## 2023-04-19 PROCEDURE — 63600175 PHARM REV CODE 636 W HCPCS: Performed by: INTERNAL MEDICINE

## 2023-04-19 PROCEDURE — 36015 PLACE CATHETER IN ARTERY: CPT | Mod: 50 | Performed by: INTERNAL MEDICINE

## 2023-04-19 PROCEDURE — 36415 COLL VENOUS BLD VENIPUNCTURE: CPT | Performed by: HOSPITALIST

## 2023-04-19 PROCEDURE — 25500020 PHARM REV CODE 255: Performed by: INTERNAL MEDICINE

## 2023-04-19 PROCEDURE — 99152 PR MOD CONSCIOUS SEDATION, SAME PHYS, 5+ YRS, FIRST 15 MIN: ICD-10-PCS | Mod: ,,, | Performed by: INTERNAL MEDICINE

## 2023-04-19 PROCEDURE — 25500020 PHARM REV CODE 255: Performed by: EMERGENCY MEDICINE

## 2023-04-19 PROCEDURE — 81003 URINALYSIS AUTO W/O SCOPE: CPT | Performed by: EMERGENCY MEDICINE

## 2023-04-19 PROCEDURE — 75743 PR  ANGIO PULMON BILAT SELECT: ICD-10-PCS | Mod: 26,59,, | Performed by: INTERNAL MEDICINE

## 2023-04-19 PROCEDURE — 75743 ARTERY X-RAYS LUNGS: CPT | Performed by: INTERNAL MEDICINE

## 2023-04-19 PROCEDURE — 63600175 PHARM REV CODE 636 W HCPCS: Performed by: EMERGENCY MEDICINE

## 2023-04-19 PROCEDURE — 81025 URINE PREGNANCY TEST: CPT | Performed by: EMERGENCY MEDICINE

## 2023-04-19 PROCEDURE — 27201423 OPTIME MED/SURG SUP & DEVICES STERILE SUPPLY: Performed by: INTERNAL MEDICINE

## 2023-04-19 PROCEDURE — 93010 ELECTROCARDIOGRAM REPORT: CPT | Mod: ,,, | Performed by: INTERNAL MEDICINE

## 2023-04-19 PROCEDURE — 96375 TX/PRO/DX INJ NEW DRUG ADDON: CPT

## 2023-04-19 PROCEDURE — C1769 GUIDE WIRE: HCPCS | Performed by: INTERNAL MEDICINE

## 2023-04-19 PROCEDURE — C1760 CLOSURE DEV, VASC: HCPCS | Performed by: INTERNAL MEDICINE

## 2023-04-19 PROCEDURE — 80307 DRUG TEST PRSMV CHEM ANLYZR: CPT | Performed by: EMERGENCY MEDICINE

## 2023-04-19 PROCEDURE — 85610 PROTHROMBIN TIME: CPT | Performed by: EMERGENCY MEDICINE

## 2023-04-19 PROCEDURE — 37184 PRIM ART M-THRMBC 1ST VSL: CPT | Mod: 50 | Performed by: INTERNAL MEDICINE

## 2023-04-19 PROCEDURE — 99291 CRITICAL CARE FIRST HOUR: CPT | Mod: 25

## 2023-04-19 RX ORDER — QUETIAPINE 400 MG/1
400 TABLET, FILM COATED, EXTENDED RELEASE ORAL NIGHTLY
Status: DISCONTINUED | OUTPATIENT
Start: 2023-04-19 | End: 2023-04-21 | Stop reason: HOSPADM

## 2023-04-19 RX ORDER — HYDROCODONE BITARTRATE AND ACETAMINOPHEN 10; 325 MG/1; MG/1
1 TABLET ORAL EVERY 6 HOURS PRN
Status: DISCONTINUED | OUTPATIENT
Start: 2023-04-19 | End: 2023-04-21 | Stop reason: HOSPADM

## 2023-04-19 RX ORDER — INSULIN ASPART 100 [IU]/ML
0-5 INJECTION, SOLUTION INTRAVENOUS; SUBCUTANEOUS
Status: DISCONTINUED | OUTPATIENT
Start: 2023-04-19 | End: 2023-04-21 | Stop reason: HOSPADM

## 2023-04-19 RX ORDER — DIPHENHYDRAMINE HYDROCHLORIDE 50 MG/ML
50 INJECTION INTRAMUSCULAR; INTRAVENOUS
Status: COMPLETED | OUTPATIENT
Start: 2023-04-19 | End: 2023-04-19

## 2023-04-19 RX ORDER — SODIUM CHLORIDE 0.9 % (FLUSH) 0.9 %
10 SYRINGE (ML) INJECTION
Status: DISCONTINUED | OUTPATIENT
Start: 2023-04-19 | End: 2023-04-21 | Stop reason: HOSPADM

## 2023-04-19 RX ORDER — ACETAMINOPHEN 325 MG/1
650 TABLET ORAL EVERY 8 HOURS PRN
Status: DISCONTINUED | OUTPATIENT
Start: 2023-04-19 | End: 2023-04-21 | Stop reason: HOSPADM

## 2023-04-19 RX ORDER — QUETIAPINE FUMARATE 100 MG/1
300 TABLET, FILM COATED ORAL NIGHTLY
Status: DISCONTINUED | OUTPATIENT
Start: 2023-04-19 | End: 2023-04-19 | Stop reason: CLARIF

## 2023-04-19 RX ORDER — FENTANYL CITRATE 50 UG/ML
INJECTION, SOLUTION INTRAMUSCULAR; INTRAVENOUS
Status: DISCONTINUED | OUTPATIENT
Start: 2023-04-19 | End: 2023-04-19 | Stop reason: HOSPADM

## 2023-04-19 RX ORDER — OXYCODONE HYDROCHLORIDE 5 MG/1
5 TABLET ORAL EVERY 4 HOURS PRN
Status: DISCONTINUED | OUTPATIENT
Start: 2023-04-19 | End: 2023-04-21 | Stop reason: HOSPADM

## 2023-04-19 RX ORDER — NALOXONE HCL 0.4 MG/ML
0.02 VIAL (ML) INJECTION
Status: DISCONTINUED | OUTPATIENT
Start: 2023-04-19 | End: 2023-04-21 | Stop reason: HOSPADM

## 2023-04-19 RX ORDER — IBUPROFEN 200 MG
24 TABLET ORAL
Status: DISCONTINUED | OUTPATIENT
Start: 2023-04-19 | End: 2023-04-21 | Stop reason: HOSPADM

## 2023-04-19 RX ORDER — SODIUM CHLORIDE 9 MG/ML
INJECTION, SOLUTION INTRAVENOUS CONTINUOUS
Status: ACTIVE | OUTPATIENT
Start: 2023-04-19 | End: 2023-04-20

## 2023-04-19 RX ORDER — MORPHINE SULFATE 4 MG/ML
3 INJECTION, SOLUTION INTRAMUSCULAR; INTRAVENOUS
Status: DISCONTINUED | OUTPATIENT
Start: 2023-04-19 | End: 2023-04-21 | Stop reason: HOSPADM

## 2023-04-19 RX ORDER — QUETIAPINE 400 MG/1
400 TABLET, FILM COATED, EXTENDED RELEASE ORAL NIGHTLY
COMMUNITY
Start: 2023-04-08

## 2023-04-19 RX ORDER — HEPARIN SODIUM,PORCINE/D5W 25000/250
0-40 INTRAVENOUS SOLUTION INTRAVENOUS CONTINUOUS
Status: DISCONTINUED | OUTPATIENT
Start: 2023-04-19 | End: 2023-04-20

## 2023-04-19 RX ORDER — ACETAMINOPHEN 325 MG/1
650 TABLET ORAL EVERY 4 HOURS PRN
Status: DISCONTINUED | OUTPATIENT
Start: 2023-04-19 | End: 2023-04-20

## 2023-04-19 RX ORDER — ONDANSETRON 2 MG/ML
4 INJECTION INTRAMUSCULAR; INTRAVENOUS EVERY 12 HOURS PRN
Status: DISCONTINUED | OUTPATIENT
Start: 2023-04-19 | End: 2023-04-21 | Stop reason: HOSPADM

## 2023-04-19 RX ORDER — CEFAZOLIN SODIUM 1 G/3ML
INJECTION, POWDER, FOR SOLUTION INTRAMUSCULAR; INTRAVENOUS
Status: DISCONTINUED | OUTPATIENT
Start: 2023-04-19 | End: 2023-04-19 | Stop reason: HOSPADM

## 2023-04-19 RX ORDER — LIDOCAINE HYDROCHLORIDE 10 MG/ML
INJECTION, SOLUTION EPIDURAL; INFILTRATION; INTRACAUDAL; PERINEURAL
Status: DISCONTINUED | OUTPATIENT
Start: 2023-04-19 | End: 2023-04-19 | Stop reason: HOSPADM

## 2023-04-19 RX ORDER — FAMOTIDINE 10 MG/ML
20 INJECTION INTRAVENOUS
Status: COMPLETED | OUTPATIENT
Start: 2023-04-19 | End: 2023-04-19

## 2023-04-19 RX ORDER — HEPARIN SODIUM 1000 [USP'U]/ML
INJECTION, SOLUTION INTRAVENOUS; SUBCUTANEOUS
Status: DISCONTINUED | OUTPATIENT
Start: 2023-04-19 | End: 2023-04-19 | Stop reason: HOSPADM

## 2023-04-19 RX ORDER — HYDROCODONE BITARTRATE AND ACETAMINOPHEN 5; 325 MG/1; MG/1
1 TABLET ORAL EVERY 6 HOURS PRN
Status: DISCONTINUED | OUTPATIENT
Start: 2023-04-19 | End: 2023-04-21 | Stop reason: HOSPADM

## 2023-04-19 RX ORDER — DIPHENHYDRAMINE HCL 25 MG
50 CAPSULE ORAL ONCE
Status: CANCELLED | OUTPATIENT
Start: 2023-04-19 | End: 2023-04-19

## 2023-04-19 RX ORDER — GLUCAGON 1 MG
1 KIT INJECTION
Status: DISCONTINUED | OUTPATIENT
Start: 2023-04-19 | End: 2023-04-21 | Stop reason: HOSPADM

## 2023-04-19 RX ORDER — ONDANSETRON 4 MG/1
4 TABLET, ORALLY DISINTEGRATING ORAL EVERY 8 HOURS PRN
Status: DISCONTINUED | OUTPATIENT
Start: 2023-04-19 | End: 2023-04-21 | Stop reason: HOSPADM

## 2023-04-19 RX ORDER — METHYLPREDNISOLONE SOD SUCC 125 MG
125 VIAL (EA) INJECTION
Status: COMPLETED | OUTPATIENT
Start: 2023-04-19 | End: 2023-04-19

## 2023-04-19 RX ORDER — QUETIAPINE FUMARATE 100 MG/1
300 TABLET, FILM COATED ORAL DAILY
Status: DISCONTINUED | OUTPATIENT
Start: 2023-04-20 | End: 2023-04-19

## 2023-04-19 RX ORDER — MIDAZOLAM HYDROCHLORIDE 1 MG/ML
INJECTION INTRAMUSCULAR; INTRAVENOUS
Status: DISCONTINUED | OUTPATIENT
Start: 2023-04-19 | End: 2023-04-19 | Stop reason: HOSPADM

## 2023-04-19 RX ORDER — SODIUM CHLORIDE 0.9 % (FLUSH) 0.9 %
10 SYRINGE (ML) INJECTION
Status: DISCONTINUED | OUTPATIENT
Start: 2023-04-19 | End: 2023-04-20

## 2023-04-19 RX ORDER — ACETAMINOPHEN 325 MG/1
650 TABLET ORAL EVERY 4 HOURS PRN
Status: DISCONTINUED | OUTPATIENT
Start: 2023-04-19 | End: 2023-04-21 | Stop reason: HOSPADM

## 2023-04-19 RX ORDER — IBUPROFEN 200 MG
16 TABLET ORAL
Status: DISCONTINUED | OUTPATIENT
Start: 2023-04-19 | End: 2023-04-21 | Stop reason: HOSPADM

## 2023-04-19 RX ADMIN — QUETIAPINE 400 MG: 400 TABLET, EXTENDED RELEASE ORAL at 09:04

## 2023-04-19 RX ADMIN — HEPARIN SODIUM 18 UNITS/KG/HR: 10000 INJECTION, SOLUTION INTRAVENOUS at 03:04

## 2023-04-19 RX ADMIN — METHYLPREDNISOLONE SODIUM SUCCINATE 125 MG: 125 INJECTION, POWDER, FOR SOLUTION INTRAMUSCULAR; INTRAVENOUS at 12:04

## 2023-04-19 RX ADMIN — IOHEXOL 75 ML: 350 INJECTION, SOLUTION INTRAVENOUS at 01:04

## 2023-04-19 RX ADMIN — DIPHENHYDRAMINE HYDROCHLORIDE 50 MG: 50 INJECTION, SOLUTION INTRAMUSCULAR; INTRAVENOUS at 12:04

## 2023-04-19 RX ADMIN — FAMOTIDINE 20 MG: 10 INJECTION INTRAVENOUS at 12:04

## 2023-04-19 RX ADMIN — SODIUM CHLORIDE: 9 INJECTION, SOLUTION INTRAVENOUS at 06:04

## 2023-04-19 RX ADMIN — HEPARIN SODIUM 15 UNITS/KG/HR: 10000 INJECTION, SOLUTION INTRAVENOUS at 08:04

## 2023-04-19 NOTE — ED TRIAGE NOTES
"Pt to ED with complaints of SOB, weakness, and lightheadedness since Sunday. Pt reports "symptoms come and go." Pt states the SOB is worse upon exertion. Pt states this is a new problem. Denies recent travel. States did recently go on a "2 week fast." Pt is extremely anxious. Tachycardic, tachypnic.   "

## 2023-04-19 NOTE — HPI
Is a pleasant 27-year-old lady.  Medical history significant for morbid obesity.  Came in with feeling short of breath for the past 1-2 weeks.  Heart rate was anxiety attack.  Yesterday almost passed out.  Came into the ER.  Found to be tachycardic with heart rate around 115-120 beats per minute in sinus tachycardia.  Also on walking short distances, desaturated to 86%.  CT scan showed bilateral submassive pulmonary embolism.  Venous ultrasound results are awaited, however prelim report demonstrates right popliteal/infrapopliteal thrombus    Patient short of breath in the ER as well as tachycardic.

## 2023-04-19 NOTE — DISCHARGE INSTRUCTIONS

## 2023-04-19 NOTE — SUBJECTIVE & OBJECTIVE
Past Medical History:   Diagnosis Date    Mental disorder        No past surgical history on file.    Review of patient's allergies indicates:   Allergen Reactions    Iodinated contrast media Swelling    Abilify [aripiprazole]        No current facility-administered medications on file prior to encounter.     Current Outpatient Medications on File Prior to Encounter   Medication Sig    albuterol (PROVENTIL/VENTOLIN HFA) 90 mcg/actuation inhaler Inhale 1-2 puffs into the lungs every 6 (six) hours as needed for Shortness of Breath. Rescue    buPROPion (WELLBUTRIN XL) 300 MG 24 hr tablet     lamoTRIgine (LAMICTAL) 100 MG tablet 100 mg 3 (three) times daily.     norgestimate-ethinyl estradiol (TRI-SPRINTEC, 28,) 0.18/0.215/0.25 mg-35 mcg (28) tablet Take 1 tablet by mouth once daily.    quetiapine (SEROQUEL XR) 300 MG Tb24 Take 150 mg by mouth once daily.      Family History       Problem Relation (Age of Onset)    Breast cancer Maternal Aunt          Tobacco Use    Smoking status: Every Day     Packs/day: 0.25     Years: 4.00     Pack years: 1.00     Types: Cigarettes    Smokeless tobacco: Never    Tobacco comments:     2 packs per week   Substance and Sexual Activity    Alcohol use: Not Currently    Drug use: Not Currently    Sexual activity: Yes     Birth control/protection: OCP     Review of Systems  Objective:     Vital Signs (Most Recent):  Temp: 98.3 °F (36.8 °C) (04/19/23 1019)  Pulse: 108 (04/19/23 1503)  Resp: 18 (04/19/23 1210)  BP: 115/62 (04/19/23 1503)  SpO2: 98 % (04/19/23 1210) Vital Signs (24h Range):  Temp:  [98.3 °F (36.8 °C)] 98.3 °F (36.8 °C)  Pulse:  [108-133] 108  Resp:  [18-22] 18  SpO2:  [94 %-98 %] 98 %  BP: (110-134)/() 115/62     Weight: 124.7 kg (275 lb)  Body mass index is 51.96 kg/m².    Physical Exam  Vitals reviewed.   Constitutional:       General: She is in acute distress (mild resp distress).   HENT:      Head: Normocephalic.   Cardiovascular:      Rate and Rhythm: Regular  rhythm. Tachycardia present.   Pulmonary:      Effort: Respiratory distress (mild with tachypnea) present.      Breath sounds: Normal breath sounds. No wheezing.   Abdominal:      Palpations: Abdomen is soft.      Tenderness: There is no abdominal tenderness.   Musculoskeletal:         General: No swelling or tenderness.   Skin:     General: Skin is warm and dry.   Neurological:      General: No focal deficit present.      Mental Status: She is alert and oriented to person, place, and time.   Psychiatric:         Mood and Affect: Mood is anxious.           Significant Labs: All pertinent labs within the past 24 hours have been reviewed.    Significant Imaging: I have reviewed all pertinent imaging results/findings within the past 24 hours.

## 2023-04-19 NOTE — ASSESSMENT & PLAN NOTE
Patient with Hypoxic Respiratory failure which is Acute.  she is not on home oxygen. Supplemental oxygen was provided and noted-      .   Signs/symptoms of respiratory failure include- tachypnea, increased work of breathing and respiratory distress. Contributing diagnoses includes - Pulmonary Embolus Labs and images were reviewed. Patient Has not had a recent ABG. Will treat underlying causes and adjust management of respiratory failure as follows- tx as stated above for PE

## 2023-04-19 NOTE — Clinical Note
75 ml of contrast were injected throughout the case. 25 mL of contrast was the total wasted during the case. 100 mL was the total amount used during the case.

## 2023-04-19 NOTE — ASSESSMENT & PLAN NOTE
27F who recently started back on OCP presents for 2 week h/o cp and sob  No recent travel, immobility, known cancer dx, surgeries, or family h/o blood clots  CTA chest with Extensive bilateral acute appearing pulmonary emboli are noted. The most central emboli are noted within the main right and left pulmonary arteries, encroaching within 3.5 cm of the main pulmonary artery bifurcation on the left. Additional bilateral lobar, inter lobar, segmental, and subsegmental emboli are noted. Flattening of the interventricular septum raising the possibility of right heart strain.  BP stable, but noted to be tachycardic which is likely compensatory.     -heparin drip  -Cardiology consulted for possible intervention. Planned for this afternoon  -BLE US pending  -Echo pending  -will need transition to OAC once cleared by Cardiology.

## 2023-04-19 NOTE — ASSESSMENT & PLAN NOTE
Morbid obesity which increases the complexity of the procedure.  This was discussed in detail with the patient.  Patient agreed and willing to proceed with the thrombectomy

## 2023-04-19 NOTE — H&P
[facility]  History & Physical    Patient Name: Loren Jensen  MRN: [id number]  Patient Class: IP- Inpatient  Admission Date: 4/19/2023  Attending Physician: Félix Fisher III, MD   Primary Care Provider: Primary Doctor No         Patient information was obtained from patient and ER records.     Subjective:     Principal Problem:Acute pulmonary embolism without acute cor pulmonale    Chief Complaint:   Chief Complaint   Patient presents with    Shortness of Breath    Weakness     28 yo female to triage for SOB, weakness, and lightheadedness since Sunday.  in triage, /103. AAOx4        HPI: 27F who presented due to 2 week h/o chest pain and  sob especially with lying flat which started suddenly and worsened acutely this morning. Pt was started on birth control 3 weeks pta after being off of it for a while, but otherwise denies recent travel, immobility, family/personal h/o clotting disorders, or recent surgeries. Pt denies fever, chills, leg pain, leg swelling, back pain, n/v, abd pain, syncope, or hemoptysis.      Past Medical History:   Diagnosis Date    Mental disorder        No past surgical history on file.    Review of patient's allergies indicates:   Allergen Reactions    Iodinated contrast media Swelling    Abilify [aripiprazole]        No current facility-administered medications on file prior to encounter.     Current Outpatient Medications on File Prior to Encounter   Medication Sig    albuterol (PROVENTIL/VENTOLIN HFA) 90 mcg/actuation inhaler Inhale 1-2 puffs into the lungs every 6 (six) hours as needed for Shortness of Breath. Rescue    buPROPion (WELLBUTRIN XL) 300 MG 24 hr tablet     lamoTRIgine (LAMICTAL) 100 MG tablet 100 mg 3 (three) times daily.     norgestimate-ethinyl estradiol (TRI-SPRINTEC, 28,) 0.18/0.215/0.25 mg-35 mcg (28) tablet Take 1 tablet by mouth once daily.    quetiapine (SEROQUEL XR) 300 MG Tb24 Take 150 mg by mouth once daily.       Family History       Problem Relation (Age of Onset)    Breast cancer Maternal Aunt          Tobacco Use    Smoking status: Every Day     Packs/day: 0.25     Years: 4.00     Pack years: 1.00     Types: Cigarettes    Smokeless tobacco: Never    Tobacco comments:     2 packs per week   Substance and Sexual Activity    Alcohol use: Not Currently    Drug use: Not Currently    Sexual activity: Yes     Birth control/protection: OCP     Review of Systems  Objective:     Vital Signs (Most Recent):  Temp: 98.3 °F (36.8 °C) (04/19/23 1019)  Pulse: 108 (04/19/23 1503)  Resp: 18 (04/19/23 1210)  BP: 115/62 (04/19/23 1503)  SpO2: 98 % (04/19/23 1210) Vital Signs (24h Range):  Temp:  [98.3 °F (36.8 °C)] 98.3 °F (36.8 °C)  Pulse:  [108-133] 108  Resp:  [18-22] 18  SpO2:  [94 %-98 %] 98 %  BP: (110-134)/() 115/62     Weight: 124.7 kg (275 lb)  Body mass index is 51.96 kg/m².    Physical Exam  Vitals reviewed.   Constitutional:       General: She is in acute distress (mild resp distress).   HENT:      Head: Normocephalic.   Cardiovascular:      Rate and Rhythm: Regular rhythm. Tachycardia present.   Pulmonary:      Effort: Respiratory distress (mild with tachypnea) present.      Breath sounds: Normal breath sounds. No wheezing.   Abdominal:      Palpations: Abdomen is soft.      Tenderness: There is no abdominal tenderness.   Musculoskeletal:         General: No swelling or tenderness.   Skin:     General: Skin is warm and dry.   Neurological:      General: No focal deficit present.      Mental Status: She is alert and oriented to person, place, and time.   Psychiatric:         Mood and Affect: Mood is anxious.           Significant Labs: All pertinent labs within the past 24 hours have been reviewed.    Significant Imaging: I have reviewed all pertinent imaging results/findings within the past 24 hours.    Assessment/Plan:     * Acute pulmonary embolism without acute cor pulmonale  27F who recently started back on  OCP presents for 2 week h/o cp and sob  No recent travel, immobility, known cancer dx, surgeries, or family h/o blood clots  CTA chest with Extensive bilateral acute appearing pulmonary emboli are noted. The most central emboli are noted within the main right and left pulmonary arteries, encroaching within 3.5 cm of the main pulmonary artery bifurcation on the left. Additional bilateral lobar, inter lobar, segmental, and subsegmental emboli are noted. Flattening of the interventricular septum raising the possibility of right heart strain.  BP stable, but noted to be tachycardic which is likely compensatory.     -heparin drip  -Cardiology consulted for possible intervention. Planned for this afternoon  -BLE US pending  -Echo pending  -will need transition to OAC once cleared by Cardiology.      Acute hypoxemic respiratory failure  Patient with Hypoxic Respiratory failure which is Acute.  she is not on home oxygen. Supplemental oxygen was provided and noted-      .   Signs/symptoms of respiratory failure include- tachypnea, increased work of breathing and respiratory distress. Contributing diagnoses includes - Pulmonary Embolus Labs and images were reviewed. Patient Has not had a recent ABG. Will treat underlying causes and adjust management of respiratory failure as follows- tx as stated above for PE    Adverse reaction to birth control pills  Recently started on OCP. Seems likely cause of PE    Severe obesity (BMI >= 40)  Body mass index is 51.96 kg/m². Morbid obesity complicates all aspects of disease management from diagnostic modalities to treatment. Weight loss encouraged and health benefits explained to patient.         Borderline personality disorder  Restart home seroquel        VTE Risk Mitigation (From admission, onward)         Ordered     Reason for No Pharmacological VTE Prophylaxis  Once        Question:  Reasons:  Answer:  IV Heparin w/in 24 hrs. Pre or Post-Op    04/19/23 9647     IP VTE HIGH RISK  PATIENT  Once         04/19/23 1548     Place sequential compression device  Until discontinued         04/19/23 1548     heparin 25,000 units in dextrose 5% (100 units/ml) IV bolus from bag - ADDITIONAL PRN BOLUS - 60 units/kg  As needed (PRN)        Question:  Heparin Infusion Adjustment (DO NOT MODIFY ANSWER)  Answer:  [email or web address]\epic\Images\Pharmacy\HeparinInfusions\heparin HIGH INTENSITY nomogram for [facility] [id number]    04/19/23 1442     heparin 25,000 units in dextrose 5% (100 units/ml) IV bolus from bag - ADDITIONAL PRN BOLUS - 30 units/kg  As needed (PRN)        Question:  Heparin Infusion Adjustment (DO NOT MODIFY ANSWER)  Answer:  [email or web address]\epic\Images\Pharmacy\HeparinInfusions\heparin HIGH INTENSITY nomogram for [facility] [id number]    04/19/23 1442     heparin 25,000 units in dextrose 5% 250 mL (100 units/mL) infusion HIGH INTENSITY nomogram - [facility]  Continuous        Question Answer Comment   Heparin Infusion Adjustment (DO NOT MODIFY ANSWER) [email or web address]\epic\Images\Pharmacy\HeparinInfusions\heparin HIGH INTENSITY nomogram for [facility] [id number]    Begin at (in units/kg/hr) 18        04/19/23 1442                           Félix Fisher III, MD  Department of Hospital Medicine  [facility] - Emergency Dept

## 2023-04-19 NOTE — HPI
27F who presented due to 2 week h/o chest pain and  sob especially with lying flat which started suddenly and worsened acutely this morning. Pt was started on birth control 3 weeks pta after being off of it for a while, but otherwise denies recent travel, immobility, family/personal h/o clotting disorders, or recent surgeries. Pt denies fever, chills, leg pain, leg swelling, back pain, n/v, abd pain, syncope, or hemoptysis.

## 2023-04-19 NOTE — ASSESSMENT & PLAN NOTE
Patient with acute submassive pulmonary embolism.  Tachycardic at rest.  Hypoxic on short distances and without oxygen.  RV to LV ratio on CT scan 1.4.  Echocardiogram demonstrates RV enlargement and hypokinesis.  Had a detailed discussion with the patient about risks benefits of medical management versus thrombectomy.  After detailed discussion, patient decided to proceed with pulmonary thrombectomy      Risks, benefits and alternatives of the  pulmonary thrombectomy procedure were discussed with the patient.The risks of pulmonary angiography and thrombectomy include but are not limited to: bleeding, infection, death, heart attack, arrhythmia, kidney injury or failure, potential need for dialysis, allergic reactions, stroke, need for emergency surgery, hematoma, pseudoaneurysm, injury to pulmonary blood vessels etc.   The risks of moderate sedation include hypotension, respiratory depression, arrhythmias, bronchospasm, and death. Informed consent was obtained and the  patient is agreeable to proceed with the procedure. Consent was placed on the chart.

## 2023-04-19 NOTE — CONSULTS
[facility] [place] - Cath Lab  Cardiology  Consult Note    Patient Name: Loren Jensen  MRN: [id number]  Admission Date: 4/19/2023  Hospital Length of Stay: 0 days  Code Status: Full Code   Attending Provider: Félix Fisher III, MD   Consulting Provider: Nicolas Carrasquillo MD  Primary Care Physician: Primary Doctor No  Principal Problem:Acute pulmonary embolism without acute cor pulmonale    Patient information was obtained from patient and ER records.     Inpatient consult to Cardiology  Consult performed by: Nicolas Carrasquillo MD  Consult ordered by: Suleiman Cruz MD        Subjective:     Chief Complaint:  pe     HPI:   Is a pleasant 27-year-old lady.  Medical history significant for morbid obesity.  Came in with feeling short of breath for the past 1-2 weeks.  Heart rate was anxiety attack.  Yesterday almost passed out.  Came into the ER.  Found to be tachycardic with heart rate around 115-120 beats per minute in sinus tachycardia.  Also on walking short distances, desaturated to 86%.  CT scan showed bilateral submassive pulmonary embolism.  Venous ultrasound results are awaited, however prelim report demonstrates right popliteal/infrapopliteal thrombus    Patient short of breath in the ER as well as tachycardic.      Past Medical History:   Diagnosis Date    Mental disorder        No past surgical history on file.    Review of patient's allergies indicates:   Allergen Reactions    Iodinated contrast media Swelling    Abilify [aripiprazole]        No current facility-administered medications on file prior to encounter.     Current Outpatient Medications on File Prior to Encounter   Medication Sig    norgestimate-ethinyl estradiol (TRI-SPRINTEC, 28,) 0.18/0.215/0.25 mg-35 mcg (28) tablet Take 1 tablet by mouth once daily.    QUEtiapine (SEROQUEL XR) 400 MG Tb24 Take 400 mg by mouth every evening.    albuterol (PROVENTIL/VENTOLIN HFA) 90 mcg/actuation inhaler Inhale 1-2 puffs into the lungs every 6 (six) hours as  needed for Shortness of Breath. Rescue    buPROPion (WELLBUTRIN XL) 300 MG 24 hr tablet     lamoTRIgine (LAMICTAL) 100 MG tablet 100 mg 3 (three) times daily.     [DISCONTINUED] quetiapine (SEROQUEL XR) 300 MG Tb24 Take 150 mg by mouth once daily.      Family History       Problem Relation (Age of Onset)    Breast cancer Maternal Aunt          Tobacco Use    Smoking status: Every Day     Packs/day: 0.25     Years: 4.00     Pack years: 1.00     Types: Cigarettes    Smokeless tobacco: Never    Tobacco comments:     2 packs per week   Substance and Sexual Activity    Alcohol use: Not Currently    Drug use: Not Currently    Sexual activity: Yes     Birth control/protection: OCP     Review of Systems   Constitutional: Negative.   HENT: Negative.     Eyes: Negative.    Cardiovascular:  Positive for dyspnea on exertion, near-syncope and palpitations.   Respiratory:  Positive for shortness of breath.    Endocrine: Negative.    Hematologic/Lymphatic: Negative.    Skin: Negative.    Musculoskeletal: Negative.    Gastrointestinal: Negative.    Genitourinary: Negative.    Neurological: Negative.    Psychiatric/Behavioral: Negative.     Allergic/Immunologic: Negative.    Objective:     Vital Signs (Most Recent):  Temp: 98.3 °F (36.8 °C) (04/19/23 1019)  Pulse: (!) 118 (04/19/23 1602)  Resp: 18 (04/19/23 1210)  BP: 121/86 (04/19/23 1602)  SpO2: 95 % (04/19/23 1601)   Vital Signs (24h Range):  Temp:  [98.3 °F (36.8 °C)] 98.3 °F (36.8 °C)  Pulse:  [108-133] 118  Resp:  [18-22] 18  SpO2:  [94 %-98 %] 95 %  BP: (110-134)/() 121/86     Weight: 124.7 kg (275 lb)  Body mass index is 51.96 kg/m².    SpO2: 95 %       No intake or output data in the 24 hours ending 04/19/23 1628    Lines/Drains/Airways       Peripheral Intravenous Line  Duration                  Peripheral IV - Single Lumen 04/19/23 1129 20 G Left Antecubital <1 day         Peripheral IV - Single Lumen 04/19/23 1506 20 G Anterior;Right Hand <1 day                     Physical Exam  Constitutional:       Appearance: Normal appearance. She is well-developed.   HENT:      Head: Normocephalic.   Eyes:      Pupils: Pupils are equal, round, and reactive to light.   Cardiovascular:      Rate and Rhythm: Regular rhythm. Tachycardia present.   Pulmonary:      Effort: Pulmonary effort is normal.      Breath sounds: Normal breath sounds.   Abdominal:      General: Bowel sounds are normal.      Palpations: Abdomen is soft.      Tenderness: There is no abdominal tenderness.   Musculoskeletal:         General: Normal range of motion.      Cervical back: Normal range of motion and neck supple.   Skin:     General: Skin is warm.   Neurological:      Mental Status: She is alert and oriented to person, place, and time.       Significant Labs: BMP:   Recent Labs   Lab 04/19/23  1113   *      K 3.9      CO2 22*   BUN 8   CREATININE 0.9   CALCIUM 9.9   , CMP   Recent Labs   Lab 04/19/23  1113      K 3.9      CO2 22*   *   BUN 8   CREATININE 0.9   CALCIUM 9.9   PROT 6.8   ALBUMIN 3.4*   BILITOT 0.3   ALKPHOS 80   AST 20   ALT 20   ANIONGAP 8   , CBC   Recent Labs   Lab 04/19/23  1113   WBC 9.42   HGB 14.0   HCT 41.5      , INR   Recent Labs   Lab 04/19/23  1500   INR 1.0   , Lipid Panel No results for input(s): CHOL, HDL, LDLCALC, TRIG, CHOLHDL in the last 48 hours., Troponin No results for input(s): TROPONINI in the last 48 hours., and All pertinent lab results from the last 24 hours have been reviewed.    Significant Imaging: Echocardiogram: Transthoracic echo (TTE) complete (Cupid Only):   Results for orders placed or performed during the hospital encounter of 04/19/23   Echo   Result Value Ref Range    TDI SEPTAL 0.05 m/s    LV LATERAL E/E' RATIO 8.25 m/s    LV SEPTAL E/E' RATIO 13.20 m/s    Left Ventricular Outflow Tract Mean Velocity 0.90 cm/s    Left Ventricular Outflow Tract Mean Gradient 3.37 mmHg    TDI LATERAL 0.08 m/s    PV PEAK  VELOCITY 0.74 cm/s    LVIDd 3.79 3.5 - 6.0 cm    IVS 1.08 0.6 - 1.1 cm    Posterior Wall 0.99 0.6 - 1.1 cm    LVIDs 2.51 2.1 - 4.0 cm    FS 34 28 - 44 %    Sinus 2.66 cm    STJ 2.53 cm    LV mass 122.73 g    LA size 4.10 cm    RVDD 4.68 cm    TAPSE 1.84 cm    RV S' 14 cm/s    Left Ventricle Relative Wall Thickness 0.52 cm    AV mean gradient 3 mmHg    AV valve area 3.12 cm2    AV Velocity Ratio 0.95     AV index (prosthetic) 0.96     MV valve area p 1/2 method 7.26 cm2    E/A ratio 0.75     Mean e' 0.07 m/s    E wave deceleration time 104.54 msec    LVOT diameter 2.04 cm    LVOT area 3.3 cm2    LVOT peak ranjit 1.09 m/s    LVOT peak VTI 17.10 cm    Ao peak ranjit 1.15 m/s    Ao VTI 17.9 cm    LVOT stroke volume 55.86 cm3    AV peak gradient 5 mmHg    E/E' ratio 10.15 m/s    MV Peak E Ranjit 0.66 m/s    TR Max Ranjit 3.38 m/s    MV stenosis pressure 1/2 time 30.32 ms    MV Peak A Ranjit 0.88 m/s    LV Systolic Volume 22.56 mL    LV Systolic Volume Index 10.4 mL/m2    LV Diastolic Volume 61.58 mL    LV Diastolic Volume Index 28.51 mL/m2    LV Mass Index 57 g/m2    RA Major Axis 5.31 cm    Left Atrium Major Axis 5.70 cm    Triscuspid Valve Regurgitation Peak Gradient 46 mmHg    RA Width 5.22 cm     Assessment and Plan:     * Acute pulmonary embolism without acute cor pulmonale  Patient with acute submassive pulmonary embolism.  Tachycardic at rest.  Hypoxic on short distances and without oxygen.  RV to LV ratio on CT scan 1.4.  Echocardiogram demonstrates RV enlargement and hypokinesis.  Had a detailed discussion with the patient about risks benefits of medical management versus thrombectomy.  After detailed discussion, patient decided to proceed with pulmonary thrombectomy      Risks, benefits and alternatives of the  pulmonary thrombectomy procedure were discussed with the patient.The risks of pulmonary angiography and thrombectomy include but are not limited to: bleeding, infection, death, heart attack, arrhythmia, kidney injury  or failure, potential need for dialysis, allergic reactions, stroke, need for emergency surgery, hematoma, pseudoaneurysm, injury to pulmonary blood vessels etc.   The risks of moderate sedation include hypotension, respiratory depression, arrhythmias, bronchospasm, and death. Informed consent was obtained and the  patient is agreeable to proceed with the procedure. Consent was placed on the chart.          Severe obesity (BMI >= 40)  Morbid obesity which increases the complexity of the procedure.  This was discussed in detail with the patient.  Patient agreed and willing to proceed with the thrombectomy    Acute hypoxemic respiratory failure  Secondary to pulmonary embolism    Borderline personality disorder            VTE Risk Mitigation (From admission, onward)         Ordered     Reason for No Pharmacological VTE Prophylaxis  Once        Question:  Reasons:  Answer:  IV Heparin w/in 24 hrs. Pre or Post-Op    04/19/23 1548     IP VTE HIGH RISK PATIENT  Once         04/19/23 1548     Place sequential compression device  Until discontinued         04/19/23 1548     heparin 25,000 units in dextrose 5% (100 units/ml) IV bolus from bag - ADDITIONAL PRN BOLUS - 60 units/kg  As needed (PRN)        Question:  Heparin Infusion Adjustment (DO NOT MODIFY ANSWER)  Answer:  [email or web address]\[email or web address]\Images\Pharmacy\HeparinInfusions\heparin HIGH INTENSITY nomogram for [facility] [id number]    04/19/23 1442     heparin 25,000 units in dextrose 5% (100 units/ml) IV bolus from bag - ADDITIONAL PRN BOLUS - 30 units/kg  As needed (PRN)        Question:  Heparin Infusion Adjustment (DO NOT MODIFY ANSWER)  Answer:  [email or web address]\epic\Images\Pharmacy\HeparinInfusions\heparin HIGH INTENSITY nomogram for [facility] [id number]    04/19/23 1442     heparin 25,000 units in dextrose 5% 250 mL (100 units/mL) infusion HIGH INTENSITY nomogram - [facility]  Continuous        Question Answer Comment   Heparin Infusion Adjustment (DO NOT MODIFY ANSWER)  [email or web address]\Images\Pharmacy\HeparinInfusions\heparin HIGH INTENSITY nomogram for [facility] [id number]    Begin at (in units/kg/hr) 18        04/19/23 5067                Thank you for your consult. I will follow-up with patient. Please contact us if you have any additional questions.    Nicolas Carrasquillo MD  Cardiology   [facility] - Cath Lab      Critical Care Time:  45 minutes     Critical care was time spent personally by me on the following activities: development of treatment plan with patient or surrogate and bedside caregivers, discussions with consultants, evaluation of patient's response to treatment, examination of patient, ordering and performing treatments and interventions, ordering and review of laboratory studies, ordering and review of radiographic studies, pulse oximetry, re-evaluation of patient's condition. This critical care time did not overlap with that of any other provider or involve time for any procedures.

## 2023-04-19 NOTE — ED PROVIDER NOTES
Encounter Date: 4/19/2023       History     Chief Complaint   Patient presents with    Shortness of Breath    Weakness     26 yo female to triage for SOB, weakness, and lightheadedness since Sunday.  in triage, /103. AAOx4     27 y.o. symptoms Past Medical History:  No date: Mental disorder   Anxiety,     Pt started OCPs two weeks ago. Notes that since Sunday she has felt shortness of breath and dyspnea on exertion does notes feeling slightly lightheaded and sensation of her heart racing/denies fevers chills nausea vomiting diarrhea dysuria or other complaints    Review of patient's allergies indicates:   Allergen Reactions    Iodinated contrast media Swelling    Abilify [aripiprazole]      Past Medical History:   Diagnosis Date    Mental disorder      No past surgical history on file.  Family History   Problem Relation Age of Onset    Breast cancer Maternal Aunt     Colon cancer Neg Hx     Ovarian cancer Neg Hx      Social History     Tobacco Use    Smoking status: Every Day     Packs/day: 0.25     Years: 4.00     Pack years: 1.00     Types: Cigarettes    Smokeless tobacco: Never    Tobacco comments:     2 packs per week   Substance Use Topics    Alcohol use: Not Currently    Drug use: Not Currently     Review of Systems   Constitutional:  Negative for fever.   HENT:  Negative for sore throat.    Respiratory:  Negative for shortness of breath.    Cardiovascular:  Negative for chest pain.   Gastrointestinal:  Negative for nausea.   Genitourinary:  Negative for dysuria.   Musculoskeletal:  Negative for back pain.   Skin:  Negative for rash.   Neurological:  Negative for weakness.   Hematological:  Does not bruise/bleed easily.   All other systems reviewed and are negative.    Physical Exam     Initial Vitals [04/19/23 1019]   BP Pulse Resp Temp SpO2   (!) 134/103 (!) 133 (!) 22 98.3 °F (36.8 °C) (!) 94 %      MAP       --         Physical Exam    Nursing note and vitals reviewed.  Constitutional: She  appears well-developed and well-nourished.   HENT:   Head: Normocephalic and atraumatic.   Eyes: Conjunctivae and EOM are normal. Pupils are equal, round, and reactive to light.   Neck:   Normal range of motion.  Cardiovascular:            Tachycardic   Pulmonary/Chest: Breath sounds normal. No respiratory distress.   Abdominal: She exhibits no distension.   Musculoskeletal:         General: Normal range of motion.      Cervical back: Normal range of motion.     Neurological: She is alert. No cranial nerve deficit. GCS score is 15. GCS eye subscore is 4. GCS verbal subscore is 5. GCS motor subscore is 6.   Skin: Skin is warm and dry.   Psychiatric: She has a normal mood and affect. Thought content normal.   No lower extremity edema no pitting  Patient is slightly tachypneic and breathing in 2-3 word sentences    Ambulatory O2 sat drops to 86% on room air but comes back up to 93% after sitting in the bed without oxygen supplementation  ED Course   Procedures  MEDICAL DECISION MAKING    After review of the patient's physical exam, ED testing, and history/symptoms, relevant labs, imaging, available outside records  a wide differential was considered including but not limited to: infectious, traumatic, vascular, toxicological , metabolic, malignant, ischemic, embolic, psychological, genetic, iatrogenic, idiopathic, medication reaction, substance dependence/intoxication/withdrawal, electrolyte or blood dyscrasia, and other etiologies.        labs/imaging/interventions include:       Medications   heparin 25,000 units in dextrose 5% 250 mL (100 units/mL) infusion HIGH INTENSITY nomogram - [facility] (18 Units/kg/hr × 78.6 kg (Adjusted) Intravenous New Bag 4/19/23 1515)   heparin 25,000 units in dextrose 5% (100 units/ml) IV bolus from bag - ADDITIONAL PRN BOLUS - 60 units/kg (has no administration in time range)   heparin 25,000 units in dextrose 5% (100 units/ml) IV bolus from bag - ADDITIONAL PRN BOLUS - 30 units/kg (has no  administration in time range)   sodium chloride 0.9% flush 10 mL (has no administration in time range)   diphenhydrAMINE injection 50 mg (50 mg Intravenous Given 4/19/23 1259)   famotidine (PF) injection 20 mg (20 mg Intravenous Given 4/19/23 1259)   methylPREDNISolone sodium succinate injection 125 mg (125 mg Intravenous Given 4/19/23 1259)   iohexoL (OMNIPAQUE 350) injection 75 mL (75 mLs Intravenous Given 4/19/23 1337)   heparin 25,000 units in dextrose 5% (100 units/ml) IV bolus from bag INITIAL BOLUS (6,288 Units Intravenous Bolus from Bag 4/19/23 1512)     Labs Reviewed   CBC W/ AUTO DIFFERENTIAL - Abnormal; Notable for the following components:       Result Value    MCV 80 (*)     All other components within normal limits   COMPREHENSIVE METABOLIC PANEL - Abnormal; Notable for the following components:    CO2 22 (*)     Glucose 113 (*)     Albumin 3.4 (*)     All other components within normal limits   URINALYSIS, REFLEX TO URINE CULTURE - Abnormal; Notable for the following components:    Color, UA Colorless (*)     All other components within normal limits    Narrative:     Specimen Source->Urine   B-TYPE NATRIURETIC PEPTIDE   DRUG SCREEN PANEL, URINE EMERGENCY    Narrative:     Specimen Source->Urine   ALCOHOL,MEDICAL (ETHANOL)   TSH   TSH   APTT    Narrative:     Draw baseline aPTT prior to starting the heparin bolus or  infusion  (if patient is on warfarin prior to heparin therapy)   PROTIME-INR    Narrative:     Draw baseline aPTT prior to starting the heparin bolus or  infusion  (if patient is on warfarin prior to heparin therapy)   SARS-COV-2 RDRP GENE   POCT INFLUENZA A/B MOLECULAR   POCT URINE PREGNANCY      CTA Chest Non-Coronary (PE Studies)   Final Result      1.  Extensive bilateral acute appearing pulmonary emboli are noted. The most central emboli are noted within the main right and left pulmonary arteries, encroaching within 3.5 cm of the main pulmonary artery bifurcation on the left.  Additional bilateral lobar, inter lobar, segmental, and subsegmental emboli are noted.   2. Flattening of the interventricular septum raising the possibility of right heart strain.   3. Additional details as provided in the body of report.   Preliminary results called to Dr. Cruz in the emergency department at approximately 14:31, 04/19/2023.         Electronically signed by: Victor Manuel Montoya   Date:    04/19/2023   Time:    14:38      US Lower Extremity Veins Bilateral    (Results Pending)   US Lower Extremity Veins Bilateral    (Results Pending)     I have independently evaluated and interpreted all available labs and imaging to the extent of the scope of my practice.  The suspected diagnosis, treatment and plan were discussed with the patient. All questions or concerns have been addressed.    Note was created using voice recognition software. Note may have occasional typographical or grammatical errors , garbled syntax, and other bizarre constructions that may not have been identified and edited despite good javier initial review prior to signing.     Labs Reviewed   CBC W/ AUTO DIFFERENTIAL - Abnormal; Notable for the following components:       Result Value    MCV 80 (*)     All other components within normal limits   COMPREHENSIVE METABOLIC PANEL - Abnormal; Notable for the following components:    CO2 22 (*)     Glucose 113 (*)     Albumin 3.4 (*)     All other components within normal limits   URINALYSIS, REFLEX TO URINE CULTURE - Abnormal; Notable for the following components:    Color, UA Colorless (*)     All other components within normal limits    Narrative:     Specimen Source->Urine   B-TYPE NATRIURETIC PEPTIDE   DRUG SCREEN PANEL, URINE EMERGENCY    Narrative:     Specimen Source->Urine   ALCOHOL,MEDICAL (ETHANOL)   TSH   TSH   APTT    Narrative:     Draw baseline aPTT prior to starting the heparin bolus or  infusion  (if patient is on warfarin prior to heparin therapy)   PROTIME-INR    Narrative:      Draw baseline aPTT prior to starting the heparin bolus or  infusion  (if patient is on warfarin prior to heparin therapy)   SARS-COV-2 RDRP GENE   POCT INFLUENZA A/B MOLECULAR   POCT URINE PREGNANCY     EKG Readings: (Independently Interpreted)   Hr 137, sinus, R axis, nl intervals, no jarret, non specific st changes     Imaging Results              CTA Chest Non-Coronary (PE Studies) (Final result)  Result time 04/19/23 14:38:03      Final result by Victor Manuel Montoya MD (04/19/23 14:38:03)                   Impression:      1.  Extensive bilateral acute appearing pulmonary emboli are noted. The most central emboli are noted within the main right and left pulmonary arteries, encroaching within 3.5 cm of the main pulmonary artery bifurcation on the left. Additional bilateral lobar, inter lobar, segmental, and subsegmental emboli are noted.  2. Flattening of the interventricular septum raising the possibility of right heart strain.  3. Additional details as provided in the body of report.  Preliminary results called to Dr. Cruz in the emergency department at approximately 14:31, 04/19/2023.      Electronically signed by: Victor Manuel Montoya  Date:    04/19/2023  Time:    14:38               Narrative:    EXAMINATION:  CTA CHEST NON CORONARY (PE STUDIES)    CLINICAL HISTORY:  Pulmonary embolism (PE) suspected, high prob;    TECHNIQUE:  Low dose axial images, sagittal and coronal reformations were obtained from the thoracic inlet to the lung bases following the IV administration of 75 mL of Omnipaque 350.  Contrast timing was optimized to evaluate the pulmonary arteries.  MIP images were performed.    COMPARISON:  None    FINDINGS:  Exam quality: Satisfactory.    Pulmonary embolism: Extensive bilateral acute appearing pulmonary emboli are noted.  The most central emboli are noted within the main right and left pulmonary arteries, encroaching within 3.5 cm of the main pulmonary artery bifurcation on the left.  Additional  bilateral lobar, inter lobar, segmental, and subsegmental emboli are noted.    Central pulmonary arteries: Suggested mild enlargement the main pulmonary artery to 3.1 cm transverse dimension.    Aorta: Nonaneurysmal.  Left-sided arch.  Suspect anatomic variant short segment common origin of the brachiocephalic and left common carotid artery.    Heart: Flattening of the interventricular septum concerning for right heart strain.    Coronary arteries: No calcifications.    Pericardium: Normal. No effusion, thickening, or calcification.    Support tubes and lines: None.    Base of neck/thyroid: Normal.    Lymph nodes: No supraclavicular, axillary, internal mammary, mediastinal, or hilar adenopathy.    Esophagus: Normal.    Pleura: No effusion, thickening, or calcification.    Upper abdomen: Unremarkable    Body wall: Unremarkable    Airways: Central airways appear patent.    Lungs: Nonspecific 4-5 mm solid nodule in the right middle lobe (series 3, image 185).  3 mm solid nodule right lower lobe (series 3, image 237).  Findings could conceivably relate to infectious or noninfectious inflammatory etiology in the appropriate clinical context.    Bones: No focal lesion.                                       Medications   heparin 25,000 units in dextrose 5% 250 mL (100 units/mL) infusion HIGH INTENSITY nomogram - [facility] (18 Units/kg/hr × 78.6 kg (Adjusted) Intravenous New Bag 4/19/23 1515)   heparin 25,000 units in dextrose 5% (100 units/ml) IV bolus from bag - ADDITIONAL PRN BOLUS - 60 units/kg (has no administration in time range)   heparin 25,000 units in dextrose 5% (100 units/ml) IV bolus from bag - ADDITIONAL PRN BOLUS - 30 units/kg (has no administration in time range)   sodium chloride 0.9% flush 10 mL (has no administration in time range)   diphenhydrAMINE injection 50 mg (50 mg Intravenous Given 4/19/23 1259)   famotidine (PF) injection 20 mg (20 mg Intravenous Given 4/19/23 1259)   methylPREDNISolone sodium  succinate injection 125 mg (125 mg Intravenous Given 4/19/23 1259)   iohexoL (OMNIPAQUE 350) injection 75 mL (75 mLs Intravenous Given 4/19/23 1337)   heparin 25,000 units in dextrose 5% (100 units/ml) IV bolus from bag INITIAL BOLUS (6,288 Units Intravenous Bolus from Bag 4/19/23 1512)      Extensive b/l pe's noted. Have d/w pt and made npo. Have ordered heparin drip, Have d/w cards intervention Dr. Carrasquillo who will evaluate. Have ordered LE doppler and cardiac echo.    Have placed pt on 2L o2 for comfort.     Will admit to ICU           Attending Attestation:         Attending Critical Care:   Critical Care Times:   Direct Patient Care (initial evaluation, reassessments, and time considering the case)................................................................60 minutes.   Additional History from reviewing old medical records or taking additional history from the family, EMS, PCP, etc.......................10 minutes.   Ordering, Reviewing, and Interpreting Diagnostic Studies...............................................................................................................10 minutes.   Documentation..................................................................................................................................................................................10 minutes.   Consultation with other Physicians. .................................................................................................................................................15 minutes.   ==============================================================  Total Critical Care Time - exclusive of procedural time: 105 minutes.  ==============================================================                     Clinical Impression:   Final diagnoses:  [R06.02] Shortness of breath  [I26.99] Pulmonary emboli (Primary)  [R09.02] Hypoxia        ED Disposition Condition    Admit Stable                Suleiman Cruz  MD  04/19/23 1536

## 2023-04-19 NOTE — ASSESSMENT & PLAN NOTE
Body mass index is 51.96 kg/m². Morbid obesity complicates all aspects of disease management from diagnostic modalities to treatment. Weight loss encouraged and health benefits explained to patient.

## 2023-04-19 NOTE — NURSING
[facility]  Nurses Note -- 4 Eyes      4/19/2023       Skin assessed on: Q Shift      [x] No Pressure Injuries Present    [x]Prevention Measures Documented    [] Yes LDA  for Pressure Injury Previously documented     [] Yes New Pressure Injury Discovered   [] LDA for New Pressure Injury Added      Attending RN:  Ashly Morrow RN     Second RN:  JOVANI Briseno

## 2023-04-19 NOTE — BRIEF OP NOTE
Successful aspiration thrombectomy of right and left pulmonary arteries with extirpation of matter from both left and right pulmonary arteries with excellent angiographic results.  Pulmonary angiography revealed large amount of clot in the right upper pulmonary artery, right lower pulmonary artery, left upper and lower pulmonary artery.  Successful thrombectomy performed in these segments.      PCWP (pre thrombectomy) : 26 mm Hg    PA (pre thrombectomy): 52/19 (35) mm Hg    PA (post thrombectomy): 36/12 (24) mm Hg    Procedures performed:    1. US guided access of right common femoral vein      2. Selective pulmonary angiography of right lower pulmonary artery with catheter placed in the right lower pulmonary artery    3. Selective pulmonary angiography of right upper pulmonary artery with catheter placed in right upper pulmonary artery    4. Selective angiography of the left pulmonary artery with catheter placed in the left pulmonary artery    5. Selective angiography of the left lower pulmonary artery with catheter placed in the left lower pulmonary artery    7. Thrombectomy of right upper pulmonary artery    8. Thrombectomy of right lower pulmonary artery    9. Thrombectomy of the left lower pulmonary artery    10. Thrombectomy of the left upper pulmonary artery    11.  Right heart catheterization with measurements of pressure as documented     Access: We accessed the right common femoral vein using ultrasound guidance. The vessel appeared widely patent, suitable for endovascular access. The images were interpreted by me and saved.      A/p:    Continue heparin drip overnight. Switch to eliquis in am.     Eliquis 10 mg b.i.d. in to 7 days followed by 5 mg b.i.d..  Since this is an unprovoked episode, continue anticoagulation lifelong.    Hematology oncology follow-up    Follow-up in Cardiology Clinic

## 2023-04-19 NOTE — SUBJECTIVE & OBJECTIVE
Past Medical History:   Diagnosis Date    Mental disorder        No past surgical history on file.    Review of patient's allergies indicates:   Allergen Reactions    Iodinated contrast media Swelling    Abilify [aripiprazole]        No current facility-administered medications on file prior to encounter.     Current Outpatient Medications on File Prior to Encounter   Medication Sig    norgestimate-ethinyl estradiol (TRI-SPRINTEC, 28,) 0.18/0.215/0.25 mg-35 mcg (28) tablet Take 1 tablet by mouth once daily.    QUEtiapine (SEROQUEL XR) 400 MG Tb24 Take 400 mg by mouth every evening.    albuterol (PROVENTIL/VENTOLIN HFA) 90 mcg/actuation inhaler Inhale 1-2 puffs into the lungs every 6 (six) hours as needed for Shortness of Breath. Rescue    buPROPion (WELLBUTRIN XL) 300 MG 24 hr tablet     lamoTRIgine (LAMICTAL) 100 MG tablet 100 mg 3 (three) times daily.     [DISCONTINUED] quetiapine (SEROQUEL XR) 300 MG Tb24 Take 150 mg by mouth once daily.      Family History       Problem Relation (Age of Onset)    Breast cancer Maternal Aunt          Tobacco Use    Smoking status: Every Day     Packs/day: 0.25     Years: 4.00     Pack years: 1.00     Types: Cigarettes    Smokeless tobacco: Never    Tobacco comments:     2 packs per week   Substance and Sexual Activity    Alcohol use: Not Currently    Drug use: Not Currently    Sexual activity: Yes     Birth control/protection: OCP     Review of Systems   Constitutional: Negative.   HENT: Negative.     Eyes: Negative.    Cardiovascular:  Positive for dyspnea on exertion, near-syncope and palpitations.   Respiratory:  Positive for shortness of breath.    Endocrine: Negative.    Hematologic/Lymphatic: Negative.    Skin: Negative.    Musculoskeletal: Negative.    Gastrointestinal: Negative.    Genitourinary: Negative.    Neurological: Negative.    Psychiatric/Behavioral: Negative.     Allergic/Immunologic: Negative.    Objective:     Vital Signs (Most Recent):  Temp: 98.3 °F (36.8  °C) (04/19/23 1019)  Pulse: (!) 118 (04/19/23 1602)  Resp: 18 (04/19/23 1210)  BP: 121/86 (04/19/23 1602)  SpO2: 95 % (04/19/23 1601)   Vital Signs (24h Range):  Temp:  [98.3 °F (36.8 °C)] 98.3 °F (36.8 °C)  Pulse:  [108-133] 118  Resp:  [18-22] 18  SpO2:  [94 %-98 %] 95 %  BP: (110-134)/() 121/86     Weight: 124.7 kg (275 lb)  Body mass index is 51.96 kg/m².    SpO2: 95 %       No intake or output data in the 24 hours ending 04/19/23 1628    Lines/Drains/Airways       Peripheral Intravenous Line  Duration                  Peripheral IV - Single Lumen 04/19/23 1129 20 G Left Antecubital <1 day         Peripheral IV - Single Lumen 04/19/23 1506 20 G Anterior;Right Hand <1 day                    Physical Exam  Constitutional:       Appearance: Normal appearance. She is well-developed.   HENT:      Head: Normocephalic.   Eyes:      Pupils: Pupils are equal, round, and reactive to light.   Cardiovascular:      Rate and Rhythm: Regular rhythm. Tachycardia present.   Pulmonary:      Effort: Pulmonary effort is normal.      Breath sounds: Normal breath sounds.   Abdominal:      General: Bowel sounds are normal.      Palpations: Abdomen is soft.      Tenderness: There is no abdominal tenderness.   Musculoskeletal:         General: Normal range of motion.      Cervical back: Normal range of motion and neck supple.   Skin:     General: Skin is warm.   Neurological:      Mental Status: She is alert and oriented to person, place, and time.       Significant Labs: BMP:   Recent Labs   Lab 04/19/23  1113   *      K 3.9      CO2 22*   BUN 8   CREATININE 0.9   CALCIUM 9.9   , CMP   Recent Labs   Lab 04/19/23  1113      K 3.9      CO2 22*   *   BUN 8   CREATININE 0.9   CALCIUM 9.9   PROT 6.8   ALBUMIN 3.4*   BILITOT 0.3   ALKPHOS 80   AST 20   ALT 20   ANIONGAP 8   , CBC   Recent Labs   Lab 04/19/23  1113   WBC 9.42   HGB 14.0   HCT 41.5      , INR   Recent Labs   Lab 04/19/23  1500    INR 1.0   , Lipid Panel No results for input(s): CHOL, HDL, LDLCALC, TRIG, CHOLHDL in the last 48 hours., Troponin No results for input(s): TROPONINI in the last 48 hours., and All pertinent lab results from the last 24 hours have been reviewed.    Significant Imaging: Echocardiogram: Transthoracic echo (TTE) complete (Cupid Only):   Results for orders placed or performed during the hospital encounter of 04/19/23   Echo   Result Value Ref Range    TDI SEPTAL 0.05 m/s    LV LATERAL E/E' RATIO 8.25 m/s    LV SEPTAL E/E' RATIO 13.20 m/s    Left Ventricular Outflow Tract Mean Velocity 0.90 cm/s    Left Ventricular Outflow Tract Mean Gradient 3.37 mmHg    TDI LATERAL 0.08 m/s    PV PEAK VELOCITY 0.74 cm/s    LVIDd 3.79 3.5 - 6.0 cm    IVS 1.08 0.6 - 1.1 cm    Posterior Wall 0.99 0.6 - 1.1 cm    LVIDs 2.51 2.1 - 4.0 cm    FS 34 28 - 44 %    Sinus 2.66 cm    STJ 2.53 cm    LV mass 122.73 g    LA size 4.10 cm    RVDD 4.68 cm    TAPSE 1.84 cm    RV S' 14 cm/s    Left Ventricle Relative Wall Thickness 0.52 cm    AV mean gradient 3 mmHg    AV valve area 3.12 cm2    AV Velocity Ratio 0.95     AV index (prosthetic) 0.96     MV valve area p 1/2 method 7.26 cm2    E/A ratio 0.75     Mean e' 0.07 m/s    E wave deceleration time 104.54 msec    LVOT diameter 2.04 cm    LVOT area 3.3 cm2    LVOT peak ranjit 1.09 m/s    LVOT peak VTI 17.10 cm    Ao peak ranjit 1.15 m/s    Ao VTI 17.9 cm    LVOT stroke volume 55.86 cm3    AV peak gradient 5 mmHg    E/E' ratio 10.15 m/s    MV Peak E Ranjit 0.66 m/s    TR Max Ranjit 3.38 m/s    MV stenosis pressure 1/2 time 30.32 ms    MV Peak A Ranjit 0.88 m/s    LV Systolic Volume 22.56 mL    LV Systolic Volume Index 10.4 mL/m2    LV Diastolic Volume 61.58 mL    LV Diastolic Volume Index 28.51 mL/m2    LV Mass Index 57 g/m2    RA Major Axis 5.31 cm    Left Atrium Major Axis 5.70 cm    Triscuspid Valve Regurgitation Peak Gradient 46 mmHg    RA Width 5.22 cm

## 2023-04-20 PROBLEM — I82.431 ACUTE DEEP VEIN THROMBOSIS (DVT) OF POPLITEAL VEIN OF RIGHT LOWER EXTREMITY: Status: ACTIVE | Noted: 2023-04-20

## 2023-04-20 LAB
ANION GAP SERPL CALC-SCNC: 9 MMOL/L (ref 8–16)
APTT PPP: 23.4 SEC (ref 21–32)
APTT PPP: 60 SEC (ref 21–32)
AV INDEX (PROSTH): 0.96
AV MEAN GRADIENT: 3 MMHG
AV PEAK GRADIENT: 5 MMHG
AV VALVE AREA: 3.12 CM2
AV VELOCITY RATIO: 0.95
BASOPHILS # BLD AUTO: 0.01 K/UL (ref 0–0.2)
BASOPHILS NFR BLD: 0.2 % (ref 0–1.9)
BUN SERPL-MCNC: 6 MG/DL (ref 6–20)
CALCIUM SERPL-MCNC: 8.1 MG/DL (ref 8.7–10.5)
CHLORIDE SERPL-SCNC: 111 MMOL/L (ref 95–110)
CO2 SERPL-SCNC: 21 MMOL/L (ref 23–29)
CREAT SERPL-MCNC: 0.8 MG/DL (ref 0.5–1.4)
CV ECHO LV RWT: 0.52 CM
DIFFERENTIAL METHOD: ABNORMAL
DOP CALC AO PEAK VEL: 1.15 M/S
DOP CALC AO VTI: 17.9 CM
DOP CALC LVOT AREA: 3.3 CM2
DOP CALC LVOT DIAMETER: 2.04 CM
DOP CALC LVOT PEAK VEL: 1.09 M/S
DOP CALC LVOT STROKE VOLUME: 55.86 CM3
DOP CALCLVOT PEAK VEL VTI: 17.1 CM
E WAVE DECELERATION TIME: 104.54 MSEC
E/A RATIO: 0.75
E/E' RATIO: 10.15 M/S
ECHO LV POSTERIOR WALL: 0.99 CM (ref 0.6–1.1)
EJECTION FRACTION: 70 %
EOSINOPHIL # BLD AUTO: 0 K/UL (ref 0–0.5)
EOSINOPHIL NFR BLD: 0 % (ref 0–8)
ERYTHROCYTE [DISTWIDTH] IN BLOOD BY AUTOMATED COUNT: 14.3 % (ref 11.5–14.5)
EST. GFR  (NO RACE VARIABLE): >60 ML/MIN/1.73 M^2
FRACTIONAL SHORTENING: 34 % (ref 28–44)
GLUCOSE SERPL-MCNC: 152 MG/DL (ref 70–110)
HCT VFR BLD AUTO: 36 % (ref 37–48.5)
HGB BLD-MCNC: 12.3 G/DL (ref 12–16)
IMM GRANULOCYTES # BLD AUTO: 0.01 K/UL (ref 0–0.04)
IMM GRANULOCYTES NFR BLD AUTO: 0.2 % (ref 0–0.5)
INTERVENTRICULAR SEPTUM: 1.08 CM (ref 0.6–1.1)
LA MAJOR: 5.7 CM
LEFT ATRIUM SIZE: 4.1 CM
LEFT INTERNAL DIMENSION IN SYSTOLE: 2.51 CM (ref 2.1–4)
LEFT VENTRICLE DIASTOLIC VOLUME INDEX: 28.51 ML/M2
LEFT VENTRICLE DIASTOLIC VOLUME: 61.58 ML
LEFT VENTRICLE MASS INDEX: 57 G/M2
LEFT VENTRICLE SYSTOLIC VOLUME INDEX: 10.4 ML/M2
LEFT VENTRICLE SYSTOLIC VOLUME: 22.56 ML
LEFT VENTRICULAR INTERNAL DIMENSION IN DIASTOLE: 3.79 CM (ref 3.5–6)
LEFT VENTRICULAR MASS: 122.73 G
LV LATERAL E/E' RATIO: 8.25 M/S
LV SEPTAL E/E' RATIO: 13.2 M/S
LVOT MG: 3.37 MMHG
LVOT MV: 0.9 CM/S
LYMPHOCYTES # BLD AUTO: 1.1 K/UL (ref 1–4.8)
LYMPHOCYTES NFR BLD: 19.3 % (ref 18–48)
MAGNESIUM SERPL-MCNC: 1.4 MG/DL (ref 1.6–2.6)
MCH RBC QN AUTO: 27.6 PG (ref 27–31)
MCHC RBC AUTO-ENTMCNC: 34.2 G/DL (ref 32–36)
MCV RBC AUTO: 81 FL (ref 82–98)
MONOCYTES # BLD AUTO: 0.1 K/UL (ref 0.3–1)
MONOCYTES NFR BLD: 1.5 % (ref 4–15)
MV PEAK A VEL: 0.88 M/S
MV PEAK E VEL: 0.66 M/S
MV STENOSIS PRESSURE HALF TIME: 30.32 MS
MV VALVE AREA P 1/2 METHOD: 7.26 CM2
NEUTROPHILS # BLD AUTO: 4.3 K/UL (ref 1.8–7.7)
NEUTROPHILS NFR BLD: 78.8 % (ref 38–73)
NRBC BLD-RTO: 0 /100 WBC
PHOSPHATE SERPL-MCNC: 2.2 MG/DL (ref 2.7–4.5)
PISA TR MAX VEL: 3.38 M/S
PLATELET # BLD AUTO: 161 K/UL (ref 150–450)
PMV BLD AUTO: 11.2 FL (ref 9.2–12.9)
POCT GLUCOSE: 119 MG/DL (ref 70–110)
POCT GLUCOSE: 121 MG/DL (ref 70–110)
POCT GLUCOSE: 132 MG/DL (ref 70–110)
POTASSIUM SERPL-SCNC: 3.7 MMOL/L (ref 3.5–5.1)
PV PEAK VELOCITY: 0.74 CM/S
RA MAJOR: 5.31 CM
RA WIDTH: 5.22 CM
RBC # BLD AUTO: 4.45 M/UL (ref 4–5.4)
RIGHT VENTRICULAR END-DIASTOLIC DIMENSION: 4.68 CM
RV TISSUE DOPPLER FREE WALL SYSTOLIC VELOCITY 1 (APICAL 4 CHAMBER VIEW): 14 CM/S
SINUS: 2.66 CM
SODIUM SERPL-SCNC: 141 MMOL/L (ref 136–145)
STJ: 2.53 CM
TDI LATERAL: 0.08 M/S
TDI SEPTAL: 0.05 M/S
TDI: 0.07 M/S
TR MAX PG: 46 MMHG
TRICUSPID ANNULAR PLANE SYSTOLIC EXCURSION: 1.84 CM
WBC # BLD AUTO: 5.44 K/UL (ref 3.9–12.7)

## 2023-04-20 PROCEDURE — 80048 BASIC METABOLIC PNL TOTAL CA: CPT | Performed by: STUDENT IN AN ORGANIZED HEALTH CARE EDUCATION/TRAINING PROGRAM

## 2023-04-20 PROCEDURE — 84100 ASSAY OF PHOSPHORUS: CPT | Performed by: STUDENT IN AN ORGANIZED HEALTH CARE EDUCATION/TRAINING PROGRAM

## 2023-04-20 PROCEDURE — 63600175 PHARM REV CODE 636 W HCPCS: Performed by: STUDENT IN AN ORGANIZED HEALTH CARE EDUCATION/TRAINING PROGRAM

## 2023-04-20 PROCEDURE — 25000003 PHARM REV CODE 250: Performed by: STUDENT IN AN ORGANIZED HEALTH CARE EDUCATION/TRAINING PROGRAM

## 2023-04-20 PROCEDURE — 21400001 HC TELEMETRY ROOM

## 2023-04-20 PROCEDURE — 85025 COMPLETE CBC W/AUTO DIFF WBC: CPT | Performed by: EMERGENCY MEDICINE

## 2023-04-20 PROCEDURE — 36415 COLL VENOUS BLD VENIPUNCTURE: CPT | Performed by: STUDENT IN AN ORGANIZED HEALTH CARE EDUCATION/TRAINING PROGRAM

## 2023-04-20 PROCEDURE — 83735 ASSAY OF MAGNESIUM: CPT | Performed by: STUDENT IN AN ORGANIZED HEALTH CARE EDUCATION/TRAINING PROGRAM

## 2023-04-20 PROCEDURE — 99291 PR CRITICAL CARE, E/M 30-74 MINUTES: ICD-10-PCS | Mod: ,,, | Performed by: INTERNAL MEDICINE

## 2023-04-20 PROCEDURE — 85730 THROMBOPLASTIN TIME PARTIAL: CPT | Performed by: EMERGENCY MEDICINE

## 2023-04-20 PROCEDURE — 25000003 PHARM REV CODE 250: Performed by: INTERNAL MEDICINE

## 2023-04-20 PROCEDURE — 85730 THROMBOPLASTIN TIME PARTIAL: CPT | Mod: 91 | Performed by: STUDENT IN AN ORGANIZED HEALTH CARE EDUCATION/TRAINING PROGRAM

## 2023-04-20 PROCEDURE — 99291 CRITICAL CARE FIRST HOUR: CPT | Mod: ,,, | Performed by: INTERNAL MEDICINE

## 2023-04-20 RX ORDER — MAGNESIUM SULFATE HEPTAHYDRATE 40 MG/ML
2 INJECTION, SOLUTION INTRAVENOUS ONCE
Status: COMPLETED | OUTPATIENT
Start: 2023-04-20 | End: 2023-04-20

## 2023-04-20 RX ORDER — MUPIROCIN 20 MG/G
OINTMENT TOPICAL 2 TIMES DAILY
Status: DISCONTINUED | OUTPATIENT
Start: 2023-04-20 | End: 2023-04-21 | Stop reason: HOSPADM

## 2023-04-20 RX ORDER — SODIUM,POTASSIUM PHOSPHATES 280-250MG
1 POWDER IN PACKET (EA) ORAL
Status: COMPLETED | OUTPATIENT
Start: 2023-04-20 | End: 2023-04-20

## 2023-04-20 RX ADMIN — APIXABAN 10 MG: 5 TABLET, FILM COATED ORAL at 08:04

## 2023-04-20 RX ADMIN — MUPIROCIN: 20 OINTMENT TOPICAL at 09:04

## 2023-04-20 RX ADMIN — QUETIAPINE 400 MG: 400 TABLET, EXTENDED RELEASE ORAL at 08:04

## 2023-04-20 RX ADMIN — MUPIROCIN: 20 OINTMENT TOPICAL at 08:04

## 2023-04-20 RX ADMIN — MAGNESIUM SULFATE HEPTAHYDRATE 2 G: 40 INJECTION, SOLUTION INTRAVENOUS at 06:04

## 2023-04-20 RX ADMIN — Medication 1 PACKET: at 04:04

## 2023-04-20 RX ADMIN — Medication 1 PACKET: at 06:04

## 2023-04-20 RX ADMIN — Medication 1 PACKET: at 12:04

## 2023-04-20 RX ADMIN — APIXABAN 10 MG: 5 TABLET, FILM COATED ORAL at 09:04

## 2023-04-20 NOTE — ASSESSMENT & PLAN NOTE
Patient with acute submassive pulmonary embolism.  Tachycardic at rest.  Hypoxic on short distances and without oxygen.  RV to LV ratio on CT scan 1.4.  Echocardiogram demonstrates RV enlargement and hypokinesis.  Had a detailed discussion with the patient about risks benefits of medical management versus thrombectomy.  After detailed discussion, patient decided to proceed with pulmonary thrombectomy      Risks, benefits and alternatives of the  pulmonary thrombectomy procedure were discussed with the patient.The risks of pulmonary angiography and thrombectomy include but are not limited to: bleeding, infection, death, heart attack, arrhythmia, kidney injury or failure, potential need for dialysis, allergic reactions, stroke, need for emergency surgery, hematoma, pseudoaneurysm, injury to pulmonary blood vessels etc.   The risks of moderate sedation include hypotension, respiratory depression, arrhythmias, bronchospasm, and death. Informed consent was obtained and the  patient is agreeable to proceed with the procedure. Consent was placed on the chart.    4/20:  Patient feeling much better.  Saturating well on room air.  No complications from pulmonary thrombectomy yesterday.  States had started using oral contraceptive pills 3 weeks prior to this episode.  Has been told not to take oral contraceptive pill.  Follow-up with Hematology Oncology to determine final duration of oral anticoagulation therapy.

## 2023-04-20 NOTE — NURSING
1927: MAYNOR White MD notified for pt seroquel error on MAR. Pt takes 400 mg seroquel at bedtime once a day instead of 300 mg @ 0900. Per MD, will change the time. [facility].        2040:MAYNOR White notified for seroquel 300 mg not extended release pill. Per MD, have pt take own medication instead. Pt called father to bring own prescription medication. When father bring medication to pt, RN will bring to pharmacy.     2121: Father brought pt prescription from home. RN bringing to pharmacy.

## 2023-04-20 NOTE — NURSING
Patient has arrived on the unit, on room air no distress noted. Awake and alert. Oriented to room, situated in bed. Call light in reach. Will continue to monitor.

## 2023-04-20 NOTE — NURSING
Hematology notified RN of aptt level of 86.2. Per heparin nomogram will hold heparin for 1 hour and restart at 15 unit. GIANNA.

## 2023-04-20 NOTE — ASSESSMENT & PLAN NOTE
27F who recently started back on OCP presents for 2 week h/o cp and sob  No recent travel, immobility, known cancer dx, surgeries, or family h/o blood clots  CTA chest with Extensive bilateral acute appearing pulmonary emboli are noted. The most central emboli are noted within the main right and left pulmonary arteries, encroaching within 3.5 cm of the main pulmonary artery bifurcation on the left. Additional bilateral lobar, inter lobar, segmental, and subsegmental emboli are noted. Flattening of the interventricular septum raising the possibility of right heart strain.  BP stable, but noted to be tachycardic which is likely compensatory.   -post-thrombectomy 4/19. O2 and bp stable    -transitioned off heparin drip to eliquis  -Cardiology following  -needs outpatient cardiology and hematology f/u on d/c.

## 2023-04-20 NOTE — NURSING
[facility], [facility] - [place], [place]  Nurses Note -- 4 Eyes      4/19/2023       Skin assessed on: Q Shift      [x] No Pressure Injuries Present    [x]Prevention Measures Documented    [] Yes LDA  for Pressure Injury Previously documented     [] Yes New Pressure Injury Discovered   [] LDA for New Pressure Injury Added      Attending RN:  KIMBERLY ARRIAGA RN     Second RN:  JOVANI Limon

## 2023-04-20 NOTE — SUBJECTIVE & OBJECTIVE
Interval History: .  No complications from pulmonary thrombectomy.  Heart rate in the 70s.  Saturating 96% on room air.  Feeling much better    Review of Systems   Constitutional: Negative.   HENT: Negative.     Eyes: Negative.    Cardiovascular: Negative.    Respiratory: Negative.     Endocrine: Negative.    Hematologic/Lymphatic: Negative.    Skin: Negative.    Musculoskeletal: Negative.    Gastrointestinal: Negative.    Genitourinary: Negative.    Neurological: Negative.    Psychiatric/Behavioral: Negative.     Allergic/Immunologic: Negative.    Objective:     Vital Signs (Most Recent):  Temp: 97.9 °F (36.6 °C) (04/20/23 0701)  Pulse: 72 (04/20/23 0900)  Resp: (!) 25 (04/20/23 0900)  BP: 109/64 (04/20/23 0900)  SpO2: 96 % (04/20/23 0900)   Vital Signs (24h Range):  Temp:  [97.9 °F (36.6 °C)-99.5 °F (37.5 °C)] 97.9 °F (36.6 °C)  Pulse:  [] 72  Resp:  [18-28] 25  SpO2:  [92 %-98 %] 96 %  BP: ()/() 109/64     Weight: (!) 141.9 kg (312 lb 13.3 oz)  Body mass index is 59.11 kg/m².     SpO2: 96 %         Intake/Output Summary (Last 24 hours) at 4/20/2023 1021  Last data filed at 4/20/2023 0701  Gross per 24 hour   Intake 1420.19 ml   Output 0 ml   Net 1420.19 ml       Lines/Drains/Airways       Peripheral Intravenous Line  Duration                  Peripheral IV - Single Lumen 04/19/23 1129 20 G Left Antecubital <1 day         Peripheral IV - Single Lumen 04/19/23 1506 20 G Anterior;Right Hand <1 day                    Physical Exam  Constitutional:       Appearance: Normal appearance. She is well-developed.   HENT:      Head: Normocephalic.   Eyes:      Pupils: Pupils are equal, round, and reactive to light.   Cardiovascular:      Rate and Rhythm: Normal rate and regular rhythm.   Pulmonary:      Effort: Pulmonary effort is normal.      Breath sounds: Normal breath sounds.   Abdominal:      General: Bowel sounds are normal.      Palpations: Abdomen is soft.      Tenderness: There is no abdominal  tenderness.   Musculoskeletal:         General: Normal range of motion.      Cervical back: Normal range of motion and neck supple.   Skin:     General: Skin is warm.   Neurological:      Mental Status: She is alert and oriented to person, place, and time.       Significant Labs: BMP:   Recent Labs   Lab 04/19/23  1113 04/20/23  0304   * 152*    141   K 3.9 3.7    111*   CO2 22* 21*   BUN 8 6   CREATININE 0.9 0.8   CALCIUM 9.9 8.1*   MG  --  1.4*   , CMP   Recent Labs   Lab 04/19/23  1113 04/20/23  0304    141   K 3.9 3.7    111*   CO2 22* 21*   * 152*   BUN 8 6   CREATININE 0.9 0.8   CALCIUM 9.9 8.1*   PROT 6.8  --    ALBUMIN 3.4*  --    BILITOT 0.3  --    ALKPHOS 80  --    AST 20  --    ALT 20  --    ANIONGAP 8 9   , CBC   Recent Labs   Lab 04/19/23  1113 04/20/23  0304   WBC 9.42 5.44   HGB 14.0 12.3   HCT 41.5 36.0*    161   , INR   Recent Labs   Lab 04/19/23  1500   INR 1.0   , Lipid Panel No results for input(s): CHOL, HDL, LDLCALC, TRIG, CHOLHDL in the last 48 hours., Troponin No results for input(s): TROPONINI in the last 48 hours., and All pertinent lab results from the last 24 hours have been reviewed.    Significant Imaging: Echocardiogram: Transthoracic echo (TTE) complete (Cupid Only):   Results for orders placed or performed during the hospital encounter of 04/19/23   Echo   Result Value Ref Range    TDI SEPTAL 0.05 m/s    LV LATERAL E/E' RATIO 8.25 m/s    LV SEPTAL E/E' RATIO 13.20 m/s    Left Ventricular Outflow Tract Mean Velocity 0.90 cm/s    Left Ventricular Outflow Tract Mean Gradient 3.37 mmHg    TDI LATERAL 0.08 m/s    PV PEAK VELOCITY 0.74 cm/s    LVIDd 3.79 3.5 - 6.0 cm    IVS 1.08 0.6 - 1.1 cm    Posterior Wall 0.99 0.6 - 1.1 cm    LVIDs 2.51 2.1 - 4.0 cm    FS 34 28 - 44 %    Sinus 2.66 cm    STJ 2.53 cm    LV mass 122.73 g    LA size 4.10 cm    RVDD 4.68 cm    TAPSE 1.84 cm    RV S' 14 cm/s    Left Ventricle Relative Wall Thickness 0.52 cm    AV  mean gradient 3 mmHg    AV valve area 3.12 cm2    AV Velocity Ratio 0.95     AV index (prosthetic) 0.96     MV valve area p 1/2 method 7.26 cm2    E/A ratio 0.75     Mean e' 0.07 m/s    E wave deceleration time 104.54 msec    LVOT diameter 2.04 cm    LVOT area 3.3 cm2    LVOT peak ranjit 1.09 m/s    LVOT peak VTI 17.10 cm    Ao peak ranjit 1.15 m/s    Ao VTI 17.9 cm    LVOT stroke volume 55.86 cm3    AV peak gradient 5 mmHg    E/E' ratio 10.15 m/s    MV Peak E Ranjit 0.66 m/s    TR Max Ranjit 3.38 m/s    MV stenosis pressure 1/2 time 30.32 ms    MV Peak A Ranjit 0.88 m/s    LV Systolic Volume 22.56 mL    LV Systolic Volume Index 10.4 mL/m2    LV Diastolic Volume 61.58 mL    LV Diastolic Volume Index 28.51 mL/m2    LV Mass Index 57 g/m2    RA Major Axis 5.31 cm    Left Atrium Major Axis 5.70 cm    Triscuspid Valve Regurgitation Peak Gradient 46 mmHg    RA Width 5.22 cm    EF 70 %    Narrative    · The left ventricle is normal in size with concentric hypertrophy and   normal systolic function.  · The estimated ejection fraction is 70%.  · Indeterminate left ventricular diastolic function.  · There is abnormal septal wall motion. There is systolic and diastolic   flattening of the interventricular septum consistent with right ventricle   pressure and volume overload.  · Moderate right ventricular enlargement with moderately reduced right   ventricular systolic function.  · Moderate right atrial enlargement.  · Mild to moderate tricuspid regurgitation.  · PA systolic > 46 mm Hg

## 2023-04-20 NOTE — NURSING
DILIA Fisher MD notified for mg 1.4 and phosphorus 2.2 with PRN replacement in MAR. Per MD, ordered replacements. Will administer and continue to monitor.

## 2023-04-20 NOTE — NURSING
[facility], [facility] - [place]  Nurses Note -- 4 Eyes      4/20/2023       Skin assessed on: Q Shift      [x] No Pressure Injuries Present    []Prevention Measures Documented    Noted R groin puncture from Heart Cath. Gauze and Tegaderm, clean dry and intact    [] Yes LDA  for Pressure Injury Previously documented     [] Yes New Pressure Injury Discovered   [] LDA for New Pressure Injury Added      Attending RN:  Ashly Nolan LPN     Second RN:  Dulce Maria HAHN

## 2023-04-20 NOTE — NURSING TRANSFER
Nursing Transfer Note      4/20/2023     Reason patient is being transferred: Stable for unit    Transfer To: room 325 from ICU    Transfer via wheelchair    Transfer with cardiac monitoring    Transported by Ysabel ICU nurse    Medicines sent: Patient home med    Any special needs or follow-up needed: n/a    Chart send with patient: Yes    Notified: patient notified father    Patient reassessed at: 04/20/2023    Upon arrival to floor: cardiac monitor applied, patient oriented to room, call bell in reach, and bed in lowest position

## 2023-04-20 NOTE — SUBJECTIVE & OBJECTIVE
Interval History: Pt states she is breathing much better currently. Denies cp or sob.    Review of Systems  Objective:     Vital Signs (Most Recent):  Temp: 97.9 °F (36.6 °C) (04/20/23 0701)  Pulse: 82 (04/20/23 1000)  Resp: (!) 24 (04/20/23 1000)  BP: 120/73 (04/20/23 1000)  SpO2: 97 % (04/20/23 1000)   Vital Signs (24h Range):  Temp:  [97.9 °F (36.6 °C)-99.5 °F (37.5 °C)] 97.9 °F (36.6 °C)  Pulse:  [] 82  Resp:  [18-29] 24  SpO2:  [92 %-98 %] 97 %  BP: ()/() 120/73     Weight: (!) 141.9 kg (312 lb 13.3 oz)  Body mass index is 59.11 kg/m².    Intake/Output Summary (Last 24 hours) at 4/20/2023 1049  Last data filed at 4/20/2023 0900  Gross per 24 hour   Intake 1448.18 ml   Output 0 ml   Net 1448.18 ml      Physical Exam  Vitals reviewed.   Constitutional:       General: She is not in acute distress.     Appearance: She is not toxic-appearing.   HENT:      Head: Normocephalic and atraumatic.   Cardiovascular:      Rate and Rhythm: Normal rate and regular rhythm.   Pulmonary:      Effort: Pulmonary effort is normal. No respiratory distress.   Musculoskeletal:         General: No swelling or tenderness.   Neurological:      General: No focal deficit present.      Mental Status: She is alert and oriented to person, place, and time.   Psychiatric:         Mood and Affect: Mood normal.         Behavior: Behavior normal.       Significant Labs: All pertinent labs within the past 24 hours have been reviewed.    Significant Imaging: I have reviewed all pertinent imaging results/findings within the past 24 hours.

## 2023-04-20 NOTE — PLAN OF CARE
DC NEEDS ASSESSMENT    PCP:  [facility]  Ph[facility]: [facility]    Patient arrived from:  home  Help at home: parents  Barriers to DC: none  Discharge plan:  1) home with family  2) home with family (no needs idedntified)      Patient's choice of someone to speak on her behalf if unable if no designated MPOA:  Yuniel Santamaria [phone]    CM will continue to follow patient while in the ICU and finalize plans         04/20/23 1443   Discharge Assessment   Assessment Type Discharge Planning Assessment   Confirmed/corrected address, phone number and insurance Yes   Confirmed Demographics Correct on Facesheet   Source of Information patient   Communicated SHILPA with patient/caregiver Yes   People in Home parent(s)   Do you expect to return to your current living situation? Yes   Do you have help at home or someone to help you manage your care at home? Yes   Prior to hospitilization cognitive status: Alert/Oriented   Current cognitive status: Alert/Oriented   Readmission within 30 days? No   Patient currently being followed by outpatient case management? No   Do you currently have service(s) that help you manage your care at home? No   Do you take prescription medications? Yes   Do you have prescription coverage? Yes   Coverage [employer]   Do you have any problems affording any of your prescribed medications? No   Who is going to help you get home at discharge? parents   How do you get to doctors appointments? car, drives self   Are you on dialysis? No   Do you take coumadin? No   DME Needed Upon Discharge  none   Discharge Plan discussed with: Patient   Discharge Barriers Identified None

## 2023-04-20 NOTE — NURSING
[facility], [facility] - [place]  Nurses Note -- 4 Eyes      4/20/2023       Skin assessed on: Q Shift      [x] No Pressure Injuries Present    [x]Prevention Measures Documented    [] Yes LDA  for Pressure Injury Previously documented     [] Yes New Pressure Injury Discovered   [] LDA for New Pressure Injury Added      Attending RN:  Ysabel Prasad RN     Second RN:  Suzanna Bravo RN

## 2023-04-20 NOTE — ASSESSMENT & PLAN NOTE
Body mass index is 59.11 kg/m². Morbid obesity complicates all aspects of disease management from diagnostic modalities to treatment. Weight loss encouraged and health benefits explained to patient.

## 2023-04-20 NOTE — PLAN OF CARE
Transitioned to eliquis today. Vaginal bleeding noted and pt anxious about why.  Discussed the stopping of birth control and how it can start a cycle, pt expressed understanding.  Transferred to tele unit.     Problem: Adult Inpatient Plan of Care  Goal: Plan of Care Review  Outcome: Ongoing, Progressing  Goal: Patient-Specific Goal (Individualized)  Outcome: Ongoing, Progressing  Goal: Absence of Hospital-Acquired Illness or Injury  Outcome: Ongoing, Progressing  Goal: Optimal Comfort and Wellbeing  Outcome: Ongoing, Progressing  Goal: Readiness for Transition of Care  Outcome: Ongoing, Progressing     Problem: Bariatric Environmental Safety  Goal: Safety Maintained with Care  Outcome: Ongoing, Progressing     Problem: Fall Injury Risk  Goal: Absence of Fall and Fall-Related Injury  Outcome: Ongoing, Progressing     Problem: Skin Injury Risk Increased  Goal: Skin Health and Integrity  Outcome: Ongoing, Progressing

## 2023-04-20 NOTE — PLAN OF CARE
AO X 4. Dressing CDI. No hematoma and pulses intact. Deny chest pain and shortness of breath. PIV X 2. Female external catheter in place. NS @ 75 ml/h; stopped. Heparin gtt @ 15 units with first therapeutic aptt. Next aptt at 0945. Magnesium and phosphorus replaced. Care explained. Free from fall and injury.

## 2023-04-20 NOTE — PROGRESS NOTES
[facility] [place] [facility]  Cardiology  Progress Note    Patient Name: Loren Jensen  MRN: [id number]  Admission Date: 4/19/2023  Hospital Length of Stay: 1 days  Code Status: Full Code   Attending Physician: Félix Fisher III, MD   Primary Care Physician: Primary Doctor No  Expected Discharge Date:   Principal Problem:Acute pulmonary embolism without acute cor pulmonale    Subjective:       Interval History: .  No complications from pulmonary thrombectomy.  Heart rate in the 70s.  Saturating 96% on room air.  Feeling much better    Review of Systems   Constitutional: Negative.   HENT: Negative.     Eyes: Negative.    Cardiovascular: Negative.    Respiratory: Negative.     Endocrine: Negative.    Hematologic/Lymphatic: Negative.    Skin: Negative.    Musculoskeletal: Negative.    Gastrointestinal: Negative.    Genitourinary: Negative.    Neurological: Negative.    Psychiatric/Behavioral: Negative.     Allergic/Immunologic: Negative.    Objective:     Vital Signs (Most Recent):  Temp: 97.9 °F (36.6 °C) (04/20/23 0701)  Pulse: 72 (04/20/23 0900)  Resp: (!) 25 (04/20/23 0900)  BP: 109/64 (04/20/23 0900)  SpO2: 96 % (04/20/23 0900)   Vital Signs (24h Range):  Temp:  [97.9 °F (36.6 °C)-99.5 °F (37.5 °C)] 97.9 °F (36.6 °C)  Pulse:  [] 72  Resp:  [18-28] 25  SpO2:  [92 %-98 %] 96 %  BP: ()/() 109/64     Weight: (!) 141.9 kg (312 lb 13.3 oz)  Body mass index is 59.11 kg/m².     SpO2: 96 %         Intake/Output Summary (Last 24 hours) at 4/20/2023 1021  Last data filed at 4/20/2023 0701  Gross per 24 hour   Intake 1420.19 ml   Output 0 ml   Net 1420.19 ml       Lines/Drains/Airways       Peripheral Intravenous Line  Duration                  Peripheral IV - Single Lumen 04/19/23 1129 20 G Left Antecubital <1 day         Peripheral IV - Single Lumen 04/19/23 1506 20 G Anterior;Right Hand <1 day                    Physical Exam  Constitutional:       Appearance: Normal appearance. She is well-developed.    HENT:      Head: Normocephalic.   Eyes:      Pupils: Pupils are equal, round, and reactive to light.   Cardiovascular:      Rate and Rhythm: Normal rate and regular rhythm.   Pulmonary:      Effort: Pulmonary effort is normal.      Breath sounds: Normal breath sounds.   Abdominal:      General: Bowel sounds are normal.      Palpations: Abdomen is soft.      Tenderness: There is no abdominal tenderness.   Musculoskeletal:         General: Normal range of motion.      Cervical back: Normal range of motion and neck supple.   Skin:     General: Skin is warm.   Neurological:      Mental Status: She is alert and oriented to person, place, and time.       Significant Labs: BMP:   Recent Labs   Lab 04/19/23  1113 04/20/23  0304   * 152*    141   K 3.9 3.7    111*   CO2 22* 21*   BUN 8 6   CREATININE 0.9 0.8   CALCIUM 9.9 8.1*   MG  --  1.4*   , CMP   Recent Labs   Lab 04/19/23  1113 04/20/23  0304    141   K 3.9 3.7    111*   CO2 22* 21*   * 152*   BUN 8 6   CREATININE 0.9 0.8   CALCIUM 9.9 8.1*   PROT 6.8  --    ALBUMIN 3.4*  --    BILITOT 0.3  --    ALKPHOS 80  --    AST 20  --    ALT 20  --    ANIONGAP 8 9   , CBC   Recent Labs   Lab 04/19/23  1113 04/20/23  0304   WBC 9.42 5.44   HGB 14.0 12.3   HCT 41.5 36.0*    161   , INR   Recent Labs   Lab 04/19/23  1500   INR 1.0   , Lipid Panel No results for input(s): CHOL, HDL, LDLCALC, TRIG, CHOLHDL in the last 48 hours., Troponin No results for input(s): TROPONINI in the last 48 hours., and All pertinent lab results from the last 24 hours have been reviewed.    Significant Imaging: Echocardiogram: Transthoracic echo (TTE) complete (Cupid Only):   Results for orders placed or performed during the hospital encounter of 04/19/23   Echo   Result Value Ref Range    TDI SEPTAL 0.05 m/s    LV LATERAL E/E' RATIO 8.25 m/s    LV SEPTAL E/E' RATIO 13.20 m/s    Left Ventricular Outflow Tract Mean Velocity 0.90 cm/s    Left Ventricular  Outflow Tract Mean Gradient 3.37 mmHg    TDI LATERAL 0.08 m/s    PV PEAK VELOCITY 0.74 cm/s    LVIDd 3.79 3.5 - 6.0 cm    IVS 1.08 0.6 - 1.1 cm    Posterior Wall 0.99 0.6 - 1.1 cm    LVIDs 2.51 2.1 - 4.0 cm    FS 34 28 - 44 %    Sinus 2.66 cm    STJ 2.53 cm    LV mass 122.73 g    LA size 4.10 cm    RVDD 4.68 cm    TAPSE 1.84 cm    RV S' 14 cm/s    Left Ventricle Relative Wall Thickness 0.52 cm    AV mean gradient 3 mmHg    AV valve area 3.12 cm2    AV Velocity Ratio 0.95     AV index (prosthetic) 0.96     MV valve area p 1/2 method 7.26 cm2    E/A ratio 0.75     Mean e' 0.07 m/s    E wave deceleration time 104.54 msec    LVOT diameter 2.04 cm    LVOT area 3.3 cm2    LVOT peak ranjit 1.09 m/s    LVOT peak VTI 17.10 cm    Ao peak ranjit 1.15 m/s    Ao VTI 17.9 cm    LVOT stroke volume 55.86 cm3    AV peak gradient 5 mmHg    E/E' ratio 10.15 m/s    MV Peak E Ranjit 0.66 m/s    TR Max Ranjit 3.38 m/s    MV stenosis pressure 1/2 time 30.32 ms    MV Peak A Ranjit 0.88 m/s    LV Systolic Volume 22.56 mL    LV Systolic Volume Index 10.4 mL/m2    LV Diastolic Volume 61.58 mL    LV Diastolic Volume Index 28.51 mL/m2    LV Mass Index 57 g/m2    RA Major Axis 5.31 cm    Left Atrium Major Axis 5.70 cm    Triscuspid Valve Regurgitation Peak Gradient 46 mmHg    RA Width 5.22 cm    EF 70 %    Narrative    · The left ventricle is normal in size with concentric hypertrophy and   normal systolic function.  · The estimated ejection fraction is 70%.  · Indeterminate left ventricular diastolic function.  · There is abnormal septal wall motion. There is systolic and diastolic   flattening of the interventricular septum consistent with right ventricle   pressure and volume overload.  · Moderate right ventricular enlargement with moderately reduced right   ventricular systolic function.  · Moderate right atrial enlargement.  · Mild to moderate tricuspid regurgitation.  · PA systolic > 46 mm Hg        Assessment and Plan:     Brief HPI:     * Acute  pulmonary embolism without acute cor pulmonale  Patient with acute submassive pulmonary embolism.  Tachycardic at rest.  Hypoxic on short distances and without oxygen.  RV to LV ratio on CT scan 1.4.  Echocardiogram demonstrates RV enlargement and hypokinesis.  Had a detailed discussion with the patient about risks benefits of medical management versus thrombectomy.  After detailed discussion, patient decided to proceed with pulmonary thrombectomy      Risks, benefits and alternatives of the  pulmonary thrombectomy procedure were discussed with the patient.The risks of pulmonary angiography and thrombectomy include but are not limited to: bleeding, infection, death, heart attack, arrhythmia, kidney injury or failure, potential need for dialysis, allergic reactions, stroke, need for emergency surgery, hematoma, pseudoaneurysm, injury to pulmonary blood vessels etc.   The risks of moderate sedation include hypotension, respiratory depression, arrhythmias, bronchospasm, and death. Informed consent was obtained and the  patient is agreeable to proceed with the procedure. Consent was placed on the chart.    4/20:  Patient feeling much better.  Saturating well on room air.  No complications from pulmonary thrombectomy yesterday.  States had started using oral contraceptive pills 3 weeks prior to this episode.  Has been told not to take oral contraceptive pill.  Switch from heparin to Eliquis.  10 mg b.i.d. for 7 days followed by 5 mg b.i.d. for at least 6 months  Follow-up with Hematology Oncology to determine final duration of oral anticoagulation therapy.      Severe obesity (BMI >= 40)  Morbid obesity which increases the complexity of the procedure.  This was discussed in detail with the patient.  Patient agreed and willing to proceed with the thrombectomy    Acute hypoxemic respiratory failure  Secondary to pulmonary embolism    Borderline personality disorder            VTE Risk Mitigation (From admission, onward)          Ordered     apixaban tablet 5 mg  2 times daily         04/20/23 0725     apixaban tablet 10 mg  2 times daily         04/20/23 0652     Reason for No Pharmacological VTE Prophylaxis  Once        Question:  Reasons:  Answer:  IV Heparin w/in 24 hrs. Pre or Post-Op    04/19/23 1548     IP VTE HIGH RISK PATIENT  Once         04/19/23 1548     Place sequential compression device  Until discontinued         04/19/23 1548                Nicolas Carrasquillo MD  Cardiology  [facility] - [place] - Intensive Care      Critical Care Time:  35 minutes     Critical care was time spent personally by me on the following activities: development of treatment plan with patient or surrogate and bedside caregivers, discussions with consultants, evaluation of patient's response to treatment, examination of patient, ordering and performing treatments and interventions, ordering and review of laboratory studies, ordering and review of radiographic studies, pulse oximetry, re-evaluation of patient's condition. This critical care time did not overlap with that of any other provider or involve time for any procedures.

## 2023-04-20 NOTE — NURSING TRANSFER
Nursing Transfer Note      4/20/2023     Reason patient is being transferred: step down in care    Transfer To: 325    Transfer via wheelchair    Transfer with cardiac monitoring    Transported by RN and transport    Medicines sent: yes    Any special needs or follow-up needed: none    Chart send with patient: Yes    Notified: patient    Upon arrival to floor: cardiac monitor applied, patient oriented to room, call bell in reach, and bed in lowest position

## 2023-04-20 NOTE — PROGRESS NOTES
[facility] Medicine  Progress Note    Patient Name: Loren Jensen  MRN: [id number]  Patient Class: IP- Inpatient   Admission Date: 4/19/2023  Length of Stay: 1 days  Attending Physician: Félix Fisher III, MD  Primary Care Provider: Primary Doctor No        Subjective:     Principal Problem:Acute pulmonary embolism without acute cor pulmonale        HPI:  27F who presented due to 2 week h/o chest pain and  sob especially with lying flat which started suddenly and worsened acutely this morning. Pt was started on birth control 3 weeks pta after being off of it for a while, but otherwise denies recent travel, immobility, family/personal h/o clotting disorders, or recent surgeries. Pt denies fever, chills, leg pain, leg swelling, back pain, n/v, abd pain, syncope, or hemoptysis.      Overview/Hospital Course:  No notes on file    Interval History: Pt states she is breathing much better currently. Denies cp or sob.    Review of Systems  Objective:     Vital Signs (Most Recent):  Temp: 97.9 °F (36.6 °C) (04/20/23 0701)  Pulse: 82 (04/20/23 1000)  Resp: (!) 24 (04/20/23 1000)  BP: 120/73 (04/20/23 1000)  SpO2: 97 % (04/20/23 1000)   Vital Signs (24h Range):  Temp:  [97.9 °F (36.6 °C)-99.5 °F (37.5 °C)] 97.9 °F (36.6 °C)  Pulse:  [] 82  Resp:  [18-29] 24  SpO2:  [92 %-98 %] 97 %  BP: ()/() 120/73     Weight: (!) 141.9 kg (312 lb 13.3 oz)  Body mass index is 59.11 kg/m².    Intake/Output Summary (Last 24 hours) at 4/20/2023 1049  Last data filed at 4/20/2023 0900  Gross per 24 hour   Intake 1448.18 ml   Output 0 ml   Net 1448.18 ml      Physical Exam  Vitals reviewed.   Constitutional:       General: She is not in acute distress.     Appearance: She is not toxic-appearing.   HENT:      Head: Normocephalic and atraumatic.   Cardiovascular:      Rate and Rhythm: Normal rate and regular rhythm.   Pulmonary:      Effort: Pulmonary effort is normal. No respiratory distress.    Musculoskeletal:         General: No swelling or tenderness.   Neurological:      General: No focal deficit present.      Mental Status: She is alert and oriented to person, place, and time.   Psychiatric:         Mood and Affect: Mood normal.         Behavior: Behavior normal.       Significant Labs: All pertinent labs within the past 24 hours have been reviewed.    Significant Imaging: I have reviewed all pertinent imaging results/findings within the past 24 hours.      Assessment/Plan:      * Acute pulmonary embolism without acute cor pulmonale  27F who recently started back on OCP presents for 2 week h/o cp and sob  No recent travel, immobility, known cancer dx, surgeries, or family h/o blood clots  CTA chest with Extensive bilateral acute appearing pulmonary emboli are noted. The most central emboli are noted within the main right and left pulmonary arteries, encroaching within 3.5 cm of the main pulmonary artery bifurcation on the left. Additional bilateral lobar, inter lobar, segmental, and subsegmental emboli are noted. Flattening of the interventricular septum raising the possibility of right heart strain.  BP stable, but noted to be tachycardic which is likely compensatory.   -post-thrombectomy 4/19. O2 and bp stable    -transitioned off heparin drip to eliquis  -Cardiology following  -needs outpatient cardiology and hematology f/u on d/c.    Acute hypoxemic respiratory failure  Patient with Hypoxic Respiratory failure which is Acute.  she is not on home oxygen. Supplemental oxygen was provided and noted-      .   Signs/symptoms of respiratory failure include- tachypnea, increased work of breathing and respiratory distress. Contributing diagnoses includes - Pulmonary Embolus Labs and images were reviewed. Patient Has not had a recent ABG. Will treat underlying causes and adjust management of respiratory failure as follows- tx as stated above for PE  -resolved    Adverse reaction to birth control  pills  Recently started on OCP and smoking tobacco products. Seems likely cause of PE    -outpatient ob/gyn f/u for discussion on birth control options    Acute deep vein thrombosis (DVT) of popliteal vein of right lower extremity  On eliquis      Severe obesity (BMI >= 40)  Body mass index is 59.11 kg/m². Morbid obesity complicates all aspects of disease management from diagnostic modalities to treatment. Weight loss encouraged and health benefits explained to patient.         Borderline personality disorder  Restart home seroquel        VTE Risk Mitigation (From admission, onward)         Ordered     apixaban tablet 5 mg  2 times daily         04/20/23 0725     apixaban tablet 10 mg  2 times daily         04/20/23 0652     Reason for No Pharmacological VTE Prophylaxis  Once        Question:  Reasons:  Answer:  IV Heparin w/in 24 hrs. Pre or Post-Op    04/19/23 1548     IP VTE HIGH RISK PATIENT  Once         04/19/23 1548     Place sequential compression device  Until discontinued         04/19/23 1548                Discharge Planning   SHILPA:      Code Status: Full Code   Is the patient medically ready for discharge?:     Reason for patient still in hospital (select all that apply): Treatment and Consult recommendations             Félix Fisher III, MD  Department of Hospital Medicine   [facility] [place] - Intensive Care

## 2023-04-20 NOTE — ASSESSMENT & PLAN NOTE
Patient with Hypoxic Respiratory failure which is Acute.  she is not on home oxygen. Supplemental oxygen was provided and noted-      .   Signs/symptoms of respiratory failure include- tachypnea, increased work of breathing and respiratory distress. Contributing diagnoses includes - Pulmonary Embolus Labs and images were reviewed. Patient Has not had a recent ABG. Will treat underlying causes and adjust management of respiratory failure as follows- tx as stated above for PE  -resolved

## 2023-04-20 NOTE — HOSPITAL COURSE
27y F admitted for bilateral submassive PE. She underwent embolectomy with cardiology. She was discharged on eliquis. She had been started on OCP shortly prior to admission and was advised not to continue this medication. All questions answered prior to dc, pt in agreement with plan.

## 2023-04-20 NOTE — NURSING
[facility]ensive [facility]  ICU Shift Summary  Date: 4/20/2023      Prehospitalization: Home  Admit Date / LOS : 4/19/2023/ 1 days    Diagnosis: Acute pulmonary embolism without acute cor pulmonale    Consults:        Active: Cardio       Needed: N/A     Code Status: Full Code   Advanced Directive: <no information>    LDA:  Lines/Drains/Airways       Peripheral Intravenous Line  Duration                  Peripheral IV - Single Lumen 04/19/23 1129 20 G Left Antecubital <1 day         Peripheral IV - Single Lumen 04/19/23 1506 20 G Anterior;Right Hand <1 day                  Central Lines/Site/Justification:Patient Does Not Have Central Line  Urinary Cath/Order/Justification:Patient Does Not Have Urinary Catheter    Vasopressors/Infusions:    sodium chloride 0.9% 75 mL/hr at 04/19/23 1813    heparin (porcine) in D5W 15 Units/kg/hr (04/19/23 2045)          GOALS: Volume/ Hemodynamic: N/A                     RASS: N/A    Pain Management: PO       Pain Controlled: yes     Rhythm: NSR    Respiratory Device: Room Air                      Most Recent SBT/ SAT: N/A       MOVE Screen: PASS  ICU Liberation: not applicable    VTE Prophylaxis: Pharm  Mobility: Ambulatory  Stress Ulcer Prophylaxis: No    Isolation: No active isolations    Dietary:   Current Diet Order   Procedures    Diet Cardiac     Please send      Tolerance: yes  Advancement: yes    I & O (24h):    Intake/Output Summary (Last 24 hours) at 4/20/2023 0519  Last data filed at 4/20/2023 0400  Gross per 24 hour   Intake 300 ml   Output 0 ml   Net 300 ml        Restraints: No    Significant Dates:  Post Op Date:  04/19/2023  Rescue Date: N/A  Imaging/ Diagnostics: N/A    Noteworthy Labs:  none    COVID Test: (--)  CBC/Anemia Labs: Coags:    Recent Labs   Lab 04/19/23  1113 04/20/23  0304   WBC 9.42 5.44   HGB 14.0 12.3   HCT 41.5 36.0*    161   MCV 80* 81*   RDW 14.0 14.3    Recent Labs   Lab 04/19/23  1500 04/19/23  1834 04/20/23  0304   INR 1.0  --   --     APTT 26.5 86.2* 60.0*        Chemistries:   Recent Labs   Lab 04/19/23  1113 04/20/23  0304    141   K 3.9 3.7    111*   CO2 22* 21*   BUN 8 6   CREATININE 0.9 0.8   CALCIUM 9.9 8.1*   PROT 6.8  --    BILITOT 0.3  --    ALKPHOS 80  --    ALT 20  --    AST 20  --    MG  --  1.4*   PHOS  --  2.2*        Cardiac Enzymes: Ejection Fractions:    No results for input(s): CPK, CPKMB, MB, TROPONINI in the last 72 hours. No results found for: EF     POCT Glucose: HbA1c:    No results for input(s): POCTGLUCOSE in the last 168 hours. No results found for: HGBA1C        ICU LOS 11h  Level of Care: OK to Transfer    Chart Check: 12 HR Done  Shift Summary/Plan for the shift: none

## 2023-04-20 NOTE — PROVIDER TRANSFER
27F who presented due to 2 week h/o chest pain and  sob especially with lying flat which started suddenly and worsened acutely this morning. Pt was started on birth control 3 weeks pta after being off of it for a while, but otherwise denies recent travel, immobility, family/personal h/o clotting disorders, or recent surgeries.Pt does endorse smoking history. Pt found to have extensive bilateral pe with possible right heart strain on cta chest. Cardiology consulted and performed embolectomy with pt having symptomatic improvement. Pt transitioned to eliquis and off heparin drip. Stable for floor transfer    -needs hematology and cardiology f/u on discharge  -rx for eliquis  -gyn f/u for birth control options at discharge.

## 2023-04-20 NOTE — ASSESSMENT & PLAN NOTE
Recently started on OCP and smoking tobacco products. Seems likely cause of PE    -outpatient ob/gyn f/u for discussion on birth control options

## 2023-04-21 VITALS
SYSTOLIC BLOOD PRESSURE: 109 MMHG | TEMPERATURE: 98 F | HEART RATE: 74 BPM | BODY MASS INDEX: 55.32 KG/M2 | HEIGHT: 61 IN | RESPIRATION RATE: 18 BRPM | DIASTOLIC BLOOD PRESSURE: 60 MMHG | OXYGEN SATURATION: 95 % | WEIGHT: 293 LBS

## 2023-04-21 LAB
ANION GAP SERPL CALC-SCNC: 10 MMOL/L (ref 8–16)
BUN SERPL-MCNC: 11 MG/DL (ref 6–20)
CALCIUM SERPL-MCNC: 8.6 MG/DL (ref 8.7–10.5)
CHLORIDE SERPL-SCNC: 109 MMOL/L (ref 95–110)
CO2 SERPL-SCNC: 23 MMOL/L (ref 23–29)
CREAT SERPL-MCNC: 0.8 MG/DL (ref 0.5–1.4)
EST. GFR  (NO RACE VARIABLE): >60 ML/MIN/1.73 M^2
GLUCOSE SERPL-MCNC: 99 MG/DL (ref 70–110)
MAGNESIUM SERPL-MCNC: 2 MG/DL (ref 1.6–2.6)
PHOSPHATE SERPL-MCNC: 2.9 MG/DL (ref 2.7–4.5)
POTASSIUM SERPL-SCNC: 3.6 MMOL/L (ref 3.5–5.1)
SODIUM SERPL-SCNC: 142 MMOL/L (ref 136–145)

## 2023-04-21 PROCEDURE — 84100 ASSAY OF PHOSPHORUS: CPT | Performed by: STUDENT IN AN ORGANIZED HEALTH CARE EDUCATION/TRAINING PROGRAM

## 2023-04-21 PROCEDURE — 80048 BASIC METABOLIC PNL TOTAL CA: CPT | Performed by: STUDENT IN AN ORGANIZED HEALTH CARE EDUCATION/TRAINING PROGRAM

## 2023-04-21 PROCEDURE — 83735 ASSAY OF MAGNESIUM: CPT | Performed by: STUDENT IN AN ORGANIZED HEALTH CARE EDUCATION/TRAINING PROGRAM

## 2023-04-21 PROCEDURE — 25000003 PHARM REV CODE 250: Performed by: STUDENT IN AN ORGANIZED HEALTH CARE EDUCATION/TRAINING PROGRAM

## 2023-04-21 PROCEDURE — 36415 COLL VENOUS BLD VENIPUNCTURE: CPT | Performed by: STUDENT IN AN ORGANIZED HEALTH CARE EDUCATION/TRAINING PROGRAM

## 2023-04-21 RX ADMIN — MUPIROCIN: 20 OINTMENT TOPICAL at 10:04

## 2023-04-21 RX ADMIN — APIXABAN 10 MG: 5 TABLET, FILM COATED ORAL at 10:04

## 2023-04-21 NOTE — PLAN OF CARE
Problem: Adult Inpatient Plan of Care  Goal: Plan of Care Review  Outcome: Ongoing, Progressing  Flowsheets (Taken 4/21/2023 1327)  Plan of Care Reviewed With: patient     Problem: Fall Injury Risk  Goal: Absence of Fall and Fall-Related Injury  Outcome: Ongoing, Progressing  Intervention: Identify and Manage Contributors  Flowsheets (Taken 4/21/2023 1327)  Self-Care Promotion:   independence encouraged   BADL personal objects within reach   BADL personal routines maintained  Medication Review/Management: medications reviewed     Problem: Skin Injury Risk Increased  Goal: Skin Health and Integrity  Outcome: Ongoing, Progressing  Intervention: Optimize Skin Protection  Flowsheets (Taken 4/21/2023 1327)  Pressure Reduction Techniques: frequent weight shift encouraged  Skin Protection:   adhesive use limited   incontinence pads utilized  Head of Bed (HOB) Positioning: HOB lowered

## 2023-04-21 NOTE — PLAN OF CARE
Pt is AAOx4. Room air. Tele maintained.   No falls or injuries reported during shift, safety precautions maintained.     Problem: Adult Inpatient Plan of Care  Goal: Plan of Care Review  Outcome: Ongoing, Progressing     Problem: Adult Inpatient Plan of Care  Goal: Patient-Specific Goal (Individualized)  Outcome: Ongoing, Progressing

## 2023-04-21 NOTE — NURSING
Patient is discharged on room air, no distress noted. Tele and IV removed with tip intact x2. Instructions reviewed with patient, Questions asked/answered understanding verbalized. Prescriptions delivered at bedside. Bed in lowest position wheels locked, call light in reach. Waiting for ride home.

## 2023-04-21 NOTE — PLAN OF CARE
CM faxed  referral to [facility]- Hematology. CM notified nurse, Ashly that pt is ready for discharge from CM standpoint.     04/21/23 1149   Final Note   Assessment Type Final Discharge Note   Anticipated Discharge Disposition Home   What phone number can be called within the next 1-3 days to see how you are doing after discharge? [phone]   Hospital Resources/Appts/Education Provided Appointments scheduled and added to AVS;Appointments scheduled by Navigator/Coordinator   Post-Acute Status   Coverage [employer] [facility]- Medicaid   Discharge Delays None known at this time

## 2023-04-21 NOTE — NURSING
[facility] [place]  Nurses Note -- 4 Eyes      4/20/2023       Skin assessed on: Q Shift      [x] No Pressure Injuries Present    [x]Prevention Measures Documented    [] Yes LDA  for Pressure Injury Previously documented     [] Yes New Pressure Injury Discovered   [] LDA for New Pressure Injury Added      Attending RN:  JORDYN YOUNG, RN     Second RN:  Ashly CULLEN LPN

## 2023-04-24 ENCOUNTER — PATIENT OUTREACH (OUTPATIENT)
Dept: ADMINISTRATIVE | Facility: CLINIC | Age: 28
End: 2023-04-24
Payer: MEDICAID

## 2023-04-24 ENCOUNTER — TELEPHONE (OUTPATIENT)
Dept: HEMATOLOGY/ONCOLOGY | Facility: CLINIC | Age: 28
End: 2023-04-24
Payer: MEDICAID

## 2023-04-24 ENCOUNTER — OFFICE VISIT (OUTPATIENT)
Dept: CARDIOLOGY | Facility: CLINIC | Age: 28
End: 2023-04-24
Payer: MEDICAID

## 2023-04-24 VITALS
HEIGHT: 61 IN | HEART RATE: 84 BPM | BODY MASS INDEX: 55.32 KG/M2 | OXYGEN SATURATION: 98 % | DIASTOLIC BLOOD PRESSURE: 64 MMHG | SYSTOLIC BLOOD PRESSURE: 102 MMHG | WEIGHT: 293 LBS | RESPIRATION RATE: 15 BRPM

## 2023-04-24 DIAGNOSIS — E66.01 SEVERE OBESITY (BMI >= 40): Primary | ICD-10-CM

## 2023-04-24 DIAGNOSIS — T65.92XA SUICIDE ATTEMPT BY SUBSTANCE OVERDOSE, INITIAL ENCOUNTER: ICD-10-CM

## 2023-04-24 DIAGNOSIS — I26.99 OTHER ACUTE PULMONARY EMBOLISM WITHOUT ACUTE COR PULMONALE: ICD-10-CM

## 2023-04-24 PROCEDURE — 3078F DIAST BP <80 MM HG: CPT | Mod: CPTII,,, | Performed by: INTERNAL MEDICINE

## 2023-04-24 PROCEDURE — 3074F PR MOST RECENT SYSTOLIC BLOOD PRESSURE < 130 MM HG: ICD-10-PCS | Mod: CPTII,,, | Performed by: INTERNAL MEDICINE

## 2023-04-24 PROCEDURE — 1160F PR REVIEW ALL MEDS BY PRESCRIBER/CLIN PHARMACIST DOCUMENTED: ICD-10-PCS | Mod: CPTII,,, | Performed by: INTERNAL MEDICINE

## 2023-04-24 PROCEDURE — 3074F SYST BP LT 130 MM HG: CPT | Mod: CPTII,,, | Performed by: INTERNAL MEDICINE

## 2023-04-24 PROCEDURE — 3078F PR MOST RECENT DIASTOLIC BLOOD PRESSURE < 80 MM HG: ICD-10-PCS | Mod: CPTII,,, | Performed by: INTERNAL MEDICINE

## 2023-04-24 PROCEDURE — 1159F PR MEDICATION LIST DOCUMENTED IN MEDICAL RECORD: ICD-10-PCS | Mod: CPTII,,, | Performed by: INTERNAL MEDICINE

## 2023-04-24 PROCEDURE — 99215 OFFICE O/P EST HI 40 MIN: CPT | Mod: PBBFAC,PO | Performed by: INTERNAL MEDICINE

## 2023-04-24 PROCEDURE — 1159F MED LIST DOCD IN RCRD: CPT | Mod: CPTII,,, | Performed by: INTERNAL MEDICINE

## 2023-04-24 PROCEDURE — 1111F DSCHRG MED/CURRENT MED MERGE: CPT | Mod: CPTII,,, | Performed by: INTERNAL MEDICINE

## 2023-04-24 PROCEDURE — 1160F RVW MEDS BY RX/DR IN RCRD: CPT | Mod: CPTII,,, | Performed by: INTERNAL MEDICINE

## 2023-04-24 PROCEDURE — 99999 PR PBB SHADOW E&M-EST. PATIENT-LVL V: ICD-10-PCS | Mod: PBBFAC,,, | Performed by: INTERNAL MEDICINE

## 2023-04-24 PROCEDURE — 3008F PR BODY MASS INDEX (BMI) DOCUMENTED: ICD-10-PCS | Mod: CPTII,,, | Performed by: INTERNAL MEDICINE

## 2023-04-24 PROCEDURE — 1111F PR DISCHARGE MEDS RECONCILED W/ CURRENT OUTPATIENT MED LIST: ICD-10-PCS | Mod: CPTII,,, | Performed by: INTERNAL MEDICINE

## 2023-04-24 PROCEDURE — 3008F BODY MASS INDEX DOCD: CPT | Mod: CPTII,,, | Performed by: INTERNAL MEDICINE

## 2023-04-24 PROCEDURE — 99999 PR PBB SHADOW E&M-EST. PATIENT-LVL V: CPT | Mod: PBBFAC,,, | Performed by: INTERNAL MEDICINE

## 2023-04-24 PROCEDURE — 99214 OFFICE O/P EST MOD 30 MIN: CPT | Mod: S$PBB,,, | Performed by: INTERNAL MEDICINE

## 2023-04-24 PROCEDURE — 99214 PR OFFICE/OUTPT VISIT, EST, LEVL IV, 30-39 MIN: ICD-10-PCS | Mod: S$PBB,,, | Performed by: INTERNAL MEDICINE

## 2023-04-24 NOTE — TELEPHONE ENCOUNTER
We  appreciate the received you forwarded to us on the above referenced patient but unfortunately our office is not contracted with her insurance  Please refer her to another facility of your choice  Thank you sir and have a nice day  Saul Rocha RN Hem/Onc    To Félix Christianson RN   We received a referral on the above referenced patient Unfortunately we are not contracted with her insurance  Please refer to another provider or facility of your choice  Thank you Saul rocha RN Hem/Onc    Emy Christianson, MSW  Saul Rocha RN  patient was referred to [facility]

## 2023-04-24 NOTE — PROGRESS NOTES
CARDIOVASCULAR CONSULTATION    REASON FOR CONSULT:   Loren Jensen is a 27 y.o. female who presents for evaluation    HISTORY OF PRESENT ILLNESS:     Patient is a pleasant 27-year-old lady.  Recently came in with pulmonary embolism.  Underwent pulmonary thrombectomy.  Feeling much better.  Breathing back to baseline.  States had started taking oral contraceptive pills a few weeks prior to her episode of pulmonary embolism.      PAST MEDICAL HISTORY:     Past Medical History:   Diagnosis Date    Mental disorder        PAST SURGICAL HISTORY:     Past Surgical History:   Procedure Laterality Date    RIGHT HEART CATHETERIZATION Right 4/19/2023    Procedure: INSERTION, CATHETER, RIGHT HEART;  Surgeon: Nicolas Carrasquillo MD;  Location: [facility] CATH LAB;  Service: Cardiology;  Laterality: Right;    THROMBECTOMY N/A 4/19/2023    Procedure: THROMBECTOMY;  Surgeon: Nicolas Carrasquillo MD;  Location: [facility] CATH LAB;  Service: Cardiology;  Laterality: N/A;       ALLERGIES AND MEDICATION:     Review of patient's allergies indicates:   Allergen Reactions    Iodinated contrast media Swelling    Abilify [aripiprazole]         Medication List            Accurate as of April 24, 2023  3:03 PM. If you have any questions, ask your nurse or doctor.                CONTINUE taking these medications      albuterol 90 mcg/actuation inhaler  Commonly known as: PROVENTIL/VENTOLIN HFA  Inhale 1-2 puffs into the lungs every 6 (six) hours as needed for Shortness of Breath. Rescue     ELIQUIS 5 mg Tab  Generic drug: apixaban  Take 2 tablets (10 mg total) by mouth 2 (two) times daily for 6 days, THEN 1 tablet (5 mg total) 2 (two) times daily.  Start taking on: April 21, 2023     QUEtiapine 400 MG Tb24  Commonly known as: SEROQUEL XR              SOCIAL HISTORY:     Social History     Socioeconomic History    Marital status: Single   Tobacco Use    Smoking status: Every Day     Packs/day: 0.25     Years: 4.00     Pack years: 1.00     Types: Cigarettes     "Smokeless tobacco: Never    Tobacco comments:     2 packs per week   Substance and Sexual Activity    Alcohol use: Not Currently    Drug use: Not Currently    Sexual activity: Yes     Birth control/protection: OCP       FAMILY HISTORY:     Family History   Problem Relation Age of Onset    Breast cancer Maternal Aunt     Colon cancer Neg Hx     Ovarian cancer Neg Hx        REVIEW OF SYSTEMS:   Review of Systems   Constitutional: Negative.   HENT: Negative.     Eyes: Negative.    Cardiovascular: Negative.    Respiratory: Negative.     Endocrine: Negative.    Hematologic/Lymphatic: Negative.    Skin: Negative.    Musculoskeletal: Negative.    Gastrointestinal: Negative.    Genitourinary: Negative.    Neurological: Negative.    Psychiatric/Behavioral: Negative.     Allergic/Immunologic: Negative.      A 10 point review of systems was performed and all the pertinent positives have been mentioned. Rest of review of systems was negative.        PHYSICAL EXAM:     Vitals:    04/24/23 1454   BP: 102/64   Pulse: 84   Resp: 15    Body mass index is 61.23 kg/m².  Weight: (!) 147 kg (324 lb 1.2 oz)   Height: 5' 1" (154.9 cm)     Physical Exam  Constitutional:       Appearance: Normal appearance. She is well-developed.   HENT:      Head: Normocephalic.   Eyes:      Pupils: Pupils are equal, round, and reactive to light.   Cardiovascular:      Rate and Rhythm: Normal rate and regular rhythm.   Pulmonary:      Effort: Pulmonary effort is normal.      Breath sounds: Normal breath sounds.   Abdominal:      General: Bowel sounds are normal.      Palpations: Abdomen is soft.      Tenderness: There is no abdominal tenderness.   Musculoskeletal:         General: Normal range of motion.      Cervical back: Normal range of motion and neck supple.   Skin:     General: Skin is warm.   Neurological:      Mental Status: She is alert and oriented to person, place, and time.         DATA:     Laboratory:  CBC:  Recent Labs   Lab 04/19/23  1113 " 04/20/23  0304   WBC 9.42 5.44   Hemoglobin 14.0 12.3   Hematocrit 41.5 36.0 L   Platelets 176 161       CHEMISTRIES:  Recent Labs   Lab 04/19/23  1113 04/20/23  0304 04/21/23  0411   Glucose 113 H 152 H 99   Sodium 139 141 142   Potassium 3.9 3.7 3.6   BUN 8 6 11   Creatinine 0.9 0.8 0.8   Calcium 9.9 8.1 L 8.6 L   Magnesium  --  1.4 L 2.0       CARDIAC BIOMARKERS:        COAGS:  Recent Labs   Lab 04/19/23  1500   INR 1.0       LIPIDS/LFTS:  Recent Labs   Lab 04/19/23  1113   AST 20   ALT 20       No results found for: HGBA1C    TSH  Recent Labs   Lab 04/19/23  1113   TSH 1.680       The ASCVD Risk score (W[place] MESA, et al., 2019) failed to calculate for the following reasons:    The 2019 ASCVD risk score is only valid for ages 40 to 79       BNP    Lab Results   Component Value Date/Time    BNP 91 04/19/2023 11:13 AM             ASSESSMENT AND PLAN     Patient Active Problem List   Diagnosis    Overdose    Major depressive disorder, recurrent episode, severe, without mention of psychotic behavior    Borderline personality disorder    Suicide attempt by substance overdose    Adverse reaction to birth control pills    Acute pulmonary embolism without acute cor pulmonale    Acute hypoxemic respiratory failure    Severe obesity (BMI >= 40)    Acute deep vein thrombosis (DVT) of popliteal vein of right lower extremity       Patient with recent episode of pulmonary embolism.  Likely provoked secondary to initiating oral contraceptive pills a few weeks prior to this episode.  Status post thrombectomy.  Doing much better.  Continue Eliquis 5 milligrams b.i.d. for at least 6 months.  Patient to follow-up with Hematology and Oncology to determine final duration of therapy.  Patient has been told not to take anymore oral contraceptive pills, but to practice other forms of contraception.  Patient understands.    Follow-up in 3 months          Thank you very much for involving me in the care of your patient.  Please do not  hesitate to contact me if there are any questions.      Nicolas Carrasquillo MD, FAC, [facility]  Interventional Cardiologist, [facility].           This note was dictated with the help of speech recognition software.  There might be un-intended errors and/or substitutions.

## 2023-04-24 NOTE — PROGRESS NOTES
C3 nurse spoke with Loren Jensen for a TCC post hospital discharge follow up call. The patient reports she has a scheduled Hospufu with [facility] on 04/25/23 @ [id number].

## 2023-04-26 NOTE — DISCHARGE SUMMARY
[facility] Medicine  Discharge Summary      Patient Name: Loren Jensen  MRN: [id number]  MAXINE: [id number]  Patient Class: IP- Inpatient  Admission Date: 4/19/2023  Hospital Length of Stay: 2 days  Discharge Date and Time: 4/21/2023  2:03 PM  Attending Physician: No att. providers found   Discharging Provider: Shubham Tellez MD  Primary Care Provider: [facility]    Primary Care Team: Networked reference to record PCT     HPI:   27F who presented due to 2 week h/o chest pain and  sob especially with lying flat which started suddenly and worsened acutely this morning. Pt was started on birth control 3 weeks pta after being off of it for a while, but otherwise denies recent travel, immobility, family/personal h/o clotting disorders, or recent surgeries. Pt denies fever, chills, leg pain, leg swelling, back pain, n/v, abd pain, syncope, or hemoptysis.      Procedure(s) (LRB):  THROMBECTOMY (N/A)  INSERTION, CATHETER, RIGHT HEART (Right)      Hospital Course:   27y F admitted for bilateral submassive PE. She underwent embolectomy with cardiology. She was discharged on eliquis. She had been started on OCP shortly prior to admission and was advised not to continue this medication. All questions answered prior to dc, pt in agreement with plan.        Goals of Care Treatment Preferences:  Code Status: Full Code      Consults:   Consults (From admission, onward)        Status Ordering Provider     Inpatient consult to Cardiology  Once        Provider:  Nicolas Carrasquillo MD    Completed KIT CANTU          No new Assessment & Plan notes have been filed under this hospital service since the last note was generated.  Service: Hospital Medicine    Final Active Diagnoses:    Diagnosis Date Noted POA    PRINCIPAL PROBLEM:  Acute pulmonary embolism without acute cor pulmonale [I26.99] 04/19/2023 Yes    Acute deep vein thrombosis (DVT) of popliteal vein of right lower extremity  [I82.431] 04/20/2023 Yes    Adverse reaction to birth control pills [T38.4X5A] 04/19/2023 Yes    Acute hypoxemic respiratory failure [J96.01] 04/19/2023 Yes    Severe obesity (BMI >= 40) [E66.01] 04/19/2023 Yes    Borderline personality disorder [F60.3] 09/19/2014 Yes      Problems Resolved During this Admission:       Discharged Condition: good    Disposition: Home or Self Care    Follow Up:   Follow-up Information     [facility] [place]. Schedule an appointment as soon as possible for a visit in 1 week(s).    Why: for Hospital Follow up  Contact information:  [address] [place] [address]  [phone]             [facility] [place]. Call.    Specialty: Obstetrics and Gynecology  Why: To schedule hospital follow up appointment., To schedule appointment  Contact information:  [address]  [place] [address]  [phone]             [facility] - Scheduling. Call.    Why: To schedule appointment with Hematology. Referral faxed.  Contact information:  [address] [address]  [phone]                       Patient Instructions:      Ambulatory referral/consult to Cardiology   Standing Status: Future   Referral Priority: Routine Referral Type: Consultation   Referral Reason: Specialty Services Required   Requested Specialty: Cardiology   Number of Visits Requested: 1     Ambulatory referral/consult to Hematology / Oncology   Standing Status: Future   Referral Priority: Routine Referral Type: Consultation   Referral Reason: Specialty Services Required   Requested Specialty: Hematology and Oncology   Number of Visits Requested: 1     Ambulatory referral/consult to Gynecology   Standing Status: Future   Referral Priority: Routine Referral Type: Consultation   Referral Reason: Specialty Services Required   Requested Specialty: Gynecology   Number of Visits Requested: 1       Significant Diagnostic Studies: as above    Pending Diagnostic Studies:      Procedure Component Value Units Date/Time    EKG 12-LEAD on arrival to floor [[id number]]     Order Status: Sent Lab Status: No result          Medications:  Reconciled Home Medications:      Medication List      START taking these medications    ELIQUIS 5 mg Tab  Generic drug: apixaban  Take 2 tablets (10 mg total) by mouth 2 (two) times daily for 6 days, THEN 1 tablet (5 mg total) 2 (two) times daily.  Start taking on: April 21, 2023        CONTINUE taking these medications    albuterol 90 mcg/actuation inhaler  Commonly known as: PROVENTIL/VENTOLIN HFA  Inhale 1-2 puffs into the lungs every 6 (six) hours as needed for Shortness of Breath. Rescue     QUEtiapine 400 MG Tb24  Commonly known as: SEROQUEL XR  Take 400 mg by mouth every evening.        STOP taking these medications    buPROPion 300 MG 24 hr tablet  Commonly known as: WELLBUTRIN XL     lamoTRIgine 100 MG tablet  Commonly known as: LAMICTAL     norgestimate-ethinyl estradioL 0.18/0.215/0.25 mg-35 mcg (28) tablet  Commonly known as: TRI-SPRINTEC (28)            Indwelling Lines/Drains at time of discharge:   Lines/Drains/Airways     None                 Time spent on the discharge of patient: 55 minutes         Shubham Tellez MD  Department of Hospital Medicine  [facility]

## 2023-07-24 PROBLEM — I26.99 ACUTE PULMONARY EMBOLISM WITHOUT ACUTE COR PULMONALE: Status: RESOLVED | Noted: 2023-04-19 | Resolved: 2023-07-24

## 2023-07-24 PROBLEM — J96.01 ACUTE HYPOXEMIC RESPIRATORY FAILURE: Status: RESOLVED | Noted: 2023-04-19 | Resolved: 2023-07-24

## 2023-08-29 ENCOUNTER — OFFICE VISIT (OUTPATIENT)
Dept: CARDIOLOGY | Facility: CLINIC | Age: 28
End: 2023-08-29
Payer: MEDICAID

## 2023-08-29 VITALS
SYSTOLIC BLOOD PRESSURE: 102 MMHG | WEIGHT: 293 LBS | HEIGHT: 61 IN | RESPIRATION RATE: 15 BRPM | OXYGEN SATURATION: 96 % | HEART RATE: 109 BPM | DIASTOLIC BLOOD PRESSURE: 74 MMHG | BODY MASS INDEX: 55.32 KG/M2

## 2023-08-29 DIAGNOSIS — E66.01 MORBID OBESITY: ICD-10-CM

## 2023-08-29 DIAGNOSIS — I26.99 PULMONARY EMBOLISM, UNSPECIFIED CHRONICITY, UNSPECIFIED PULMONARY EMBOLISM TYPE, UNSPECIFIED WHETHER ACUTE COR PULMONALE PRESENT: ICD-10-CM

## 2023-08-29 DIAGNOSIS — R06.02 SHORTNESS OF BREATH: Primary | ICD-10-CM

## 2023-08-29 DIAGNOSIS — I82.431 ACUTE DEEP VEIN THROMBOSIS (DVT) OF POPLITEAL VEIN OF RIGHT LOWER EXTREMITY: ICD-10-CM

## 2023-08-29 PROCEDURE — 99999 PR PBB SHADOW E&M-EST. PATIENT-LVL V: CPT | Mod: PBBFAC,,, | Performed by: INTERNAL MEDICINE

## 2023-08-29 PROCEDURE — 1160F PR REVIEW ALL MEDS BY PRESCRIBER/CLIN PHARMACIST DOCUMENTED: ICD-10-PCS | Mod: CPTII,,, | Performed by: INTERNAL MEDICINE

## 2023-08-29 PROCEDURE — 3008F PR BODY MASS INDEX (BMI) DOCUMENTED: ICD-10-PCS | Mod: CPTII,,, | Performed by: INTERNAL MEDICINE

## 2023-08-29 PROCEDURE — 99214 PR OFFICE/OUTPT VISIT, EST, LEVL IV, 30-39 MIN: ICD-10-PCS | Mod: S$PBB,,, | Performed by: INTERNAL MEDICINE

## 2023-08-29 PROCEDURE — 99215 OFFICE O/P EST HI 40 MIN: CPT | Mod: PBBFAC,PO | Performed by: INTERNAL MEDICINE

## 2023-08-29 PROCEDURE — 99214 OFFICE O/P EST MOD 30 MIN: CPT | Mod: S$PBB,,, | Performed by: INTERNAL MEDICINE

## 2023-08-29 PROCEDURE — 1160F RVW MEDS BY RX/DR IN RCRD: CPT | Mod: CPTII,,, | Performed by: INTERNAL MEDICINE

## 2023-08-29 PROCEDURE — 3008F BODY MASS INDEX DOCD: CPT | Mod: CPTII,,, | Performed by: INTERNAL MEDICINE

## 2023-08-29 PROCEDURE — 93005 ELECTROCARDIOGRAM TRACING: CPT | Mod: PBBFAC,PO | Performed by: INTERNAL MEDICINE

## 2023-08-29 PROCEDURE — 3078F DIAST BP <80 MM HG: CPT | Mod: CPTII,,, | Performed by: INTERNAL MEDICINE

## 2023-08-29 PROCEDURE — 99999 PR PBB SHADOW E&M-EST. PATIENT-LVL V: ICD-10-PCS | Mod: PBBFAC,,, | Performed by: INTERNAL MEDICINE

## 2023-08-29 PROCEDURE — 93010 EKG 12-LEAD: ICD-10-PCS | Mod: S$PBB,,, | Performed by: INTERNAL MEDICINE

## 2023-08-29 PROCEDURE — 3074F SYST BP LT 130 MM HG: CPT | Mod: CPTII,,, | Performed by: INTERNAL MEDICINE

## 2023-08-29 PROCEDURE — 3074F PR MOST RECENT SYSTOLIC BLOOD PRESSURE < 130 MM HG: ICD-10-PCS | Mod: CPTII,,, | Performed by: INTERNAL MEDICINE

## 2023-08-29 PROCEDURE — 3078F PR MOST RECENT DIASTOLIC BLOOD PRESSURE < 80 MM HG: ICD-10-PCS | Mod: CPTII,,, | Performed by: INTERNAL MEDICINE

## 2023-08-29 PROCEDURE — 1159F PR MEDICATION LIST DOCUMENTED IN MEDICAL RECORD: ICD-10-PCS | Mod: CPTII,,, | Performed by: INTERNAL MEDICINE

## 2023-08-29 PROCEDURE — 1159F MED LIST DOCD IN RCRD: CPT | Mod: CPTII,,, | Performed by: INTERNAL MEDICINE

## 2023-08-29 PROCEDURE — 93010 ELECTROCARDIOGRAM REPORT: CPT | Mod: S$PBB,,, | Performed by: INTERNAL MEDICINE

## 2023-08-29 NOTE — PROGRESS NOTES
CARDIOVASCULAR CONSULTATION    REASON FOR CONSULT:   Loren Jensen is a 27 y.o. female who presents for evaluation    HISTORY OF PRESENT ILLNESS:     Patient is a pleasant 27-year-old lady.  Recently came in with pulmonary embolism.  Underwent pulmonary thrombectomy.  Feeling much better.  Breathing back to baseline.  States had started taking oral contraceptive pills a few weeks prior to her episode of pulmonary embolism.      Notes from August 23: Patient here for follow-up.  Doing fine.  Denies any chest pains at rest on exertion, orthopnea, PND      PAST MEDICAL HISTORY:     Past Medical History:   Diagnosis Date    Mental disorder        PAST SURGICAL HISTORY:     Past Surgical History:   Procedure Laterality Date    RIGHT HEART CATHETERIZATION Right 4/19/2023    Procedure: INSERTION, CATHETER, RIGHT HEART;  Surgeon: Nicolas Carrasquillo MD;  Location: [facility] CATH LAB;  Service: Cardiology;  Laterality: Right;    THROMBECTOMY N/A 4/19/2023    Procedure: THROMBECTOMY;  Surgeon: Nicolas Carrasquillo MD;  Location: [facility] CATH LAB;  Service: Cardiology;  Laterality: N/A;       ALLERGIES AND MEDICATION:     Review of patient's allergies indicates:   Allergen Reactions    Iodinated contrast media Swelling    Abilify [aripiprazole]         Medication List            Accurate as of August 29, 2023  2:26 PM. If you have any questions, ask your nurse or doctor.                CONTINUE taking these medications      albuterol 90 mcg/actuation inhaler  Commonly known as: PROVENTIL/VENTOLIN HFA  Inhale 1-2 puffs into the lungs every 6 (six) hours as needed for Shortness of Breath. Rescue     * ELIQUIS 5 mg Tab  Generic drug: apixaban  Take 2 tablets (10 mg total) by mouth 2 (two) times daily for 6 days, THEN 1 tablet (5 mg total) 2 (two) times daily.  Start taking on: April 21, 2023     * apixaban 5 mg Tab  Commonly known as: ELIQUIS  Take 1 tablet (5 mg total) by mouth 2 (two) times daily.     QUEtiapine 400 MG Tb24  Commonly known as:  "SEROQUEL XR           * This list has 2 medication(s) that are the same as other medications prescribed for you. Read the directions carefully, and ask your doctor or other care provider to review them with you.                  SOCIAL HISTORY:     Social History     Socioeconomic History    Marital status: Single   Tobacco Use    Smoking status: Every Day     Current packs/day: 0.25     Average packs/day: 0.3 packs/day for 4.0 years (1.0 ttl pk-yrs)     Types: Cigarettes    Smokeless tobacco: Never    Tobacco comments:     2 packs per week   Substance and Sexual Activity    Alcohol use: Not Currently    Drug use: Not Currently    Sexual activity: Yes     Birth control/protection: OCP       FAMILY HISTORY:     Family History   Problem Relation Age of Onset    Breast cancer Maternal Aunt     Colon cancer Neg Hx     Ovarian cancer Neg Hx        REVIEW OF SYSTEMS:   Review of Systems   Constitutional: Negative.   HENT: Negative.     Eyes: Negative.    Cardiovascular: Negative.    Respiratory: Negative.     Endocrine: Negative.    Hematologic/Lymphatic: Negative.    Skin: Negative.    Musculoskeletal: Negative.    Gastrointestinal: Negative.    Genitourinary: Negative.    Neurological: Negative.    Psychiatric/Behavioral: Negative.     Allergic/Immunologic: Negative.        A 10 point review of systems was performed and all the pertinent positives have been mentioned. Rest of review of systems was negative.        PHYSICAL EXAM:     Vitals:    08/29/23 1402   BP: 102/74   Pulse: 109   Resp: 15    Body mass index is 64.75 kg/m².  Weight: (!) 155.4 kg (342 lb 11.3 oz)   Height: 5' 1" (154.9 cm)     Physical Exam  Constitutional:       Appearance: Normal appearance. She is well-developed.   HENT:      Head: Normocephalic.   Eyes:      Pupils: Pupils are equal, round, and reactive to light.   Cardiovascular:      Rate and Rhythm: Normal rate and regular rhythm.   Pulmonary:      Effort: Pulmonary effort is normal.      " "Breath sounds: Normal breath sounds.   Abdominal:      General: Bowel sounds are normal.      Palpations: Abdomen is soft.      Tenderness: There is no abdominal tenderness.   Musculoskeletal:         General: Normal range of motion.      Cervical back: Normal range of motion and neck supple.   Skin:     General: Skin is warm.   Neurological:      Mental Status: She is alert and oriented to person, place, and time.           DATA:     Laboratory:  CBC:  Recent Labs   Lab 04/19/23  1113 04/20/23  0304   WBC 9.42 5.44   Hemoglobin 14.0 12.3   Hematocrit 41.5 36.0 L   Platelets 176 161         CHEMISTRIES:  Recent Labs   Lab 04/19/23  1113 04/20/23  0304 04/21/23  0411   Glucose 113 H 152 H 99   Sodium 139 141 142   Potassium 3.9 3.7 3.6   BUN 8 6 11   Creatinine 0.9 0.8 0.8   Calcium 9.9 8.1 L 8.6 L   Magnesium  --  1.4 L 2.0         CARDIAC BIOMARKERS:        COAGS:  Recent Labs   Lab 04/19/23  1500   INR 1.0         LIPIDS/LFTS:  Recent Labs   Lab 04/19/23  1113   AST 20   ALT 20         No results found for: "HGBA1C"    TSH  Recent Labs   Lab 04/19/23  1113   TSH 1.680         The ASCVD Risk score (Wellington DK, et al., 2019) failed to calculate for the following reasons:    The 2019 ASCVD risk score is only valid for ages 40 to 79       BNP    Lab Results   Component Value Date/Time    BNP 91 04/19/2023 11:13 AM             ASSESSMENT AND PLAN     Patient Active Problem List   Diagnosis    Overdose    Major depressive disorder, recurrent episode, severe, without mention of psychotic behavior    Borderline personality disorder    Suicide attempt by substance overdose    Adverse reaction to birth control pills    Severe obesity (BMI >= 40)    Acute deep vein thrombosis (DVT) of popliteal vein of right lower extremity       Patient with recent episode of pulmonary embolism.  Likely provoked secondary to initiating oral contraceptive pills a few weeks prior to this episode.  Status post thrombectomy.  Doing much better.  " Continue Eliquis 5 milligrams b.i.d. for at least 6 months.  Patient to follow-up with Hematology and Oncology to determine final duration of therapy.  Patient has been told not to take anymore oral contraceptive pills, but to practice other forms of contraception.  Patient understands.    Heme-Onc consult has been placed again    Follow-up in 3 months with echo prior          Thank you very much for involving me in the care of your patient.  Please do not hesitate to contact me if there are any questions.      Nicolas Carrasquillo MD, FACC, [facility]  Interventional Cardiologist, [facility].           This note was dictated with the help of speech recognition software.  There might be un-intended errors and/or substitutions.

## 2023-09-06 ENCOUNTER — HOSPITAL ENCOUNTER (OUTPATIENT)
Dept: CARDIOLOGY | Facility: HOSPITAL | Age: 28
Discharge: HOME | End: 2023-09-06
Attending: INTERNAL MEDICINE
Payer: MEDICAID

## 2023-09-06 DIAGNOSIS — R06.02 SHORTNESS OF BREATH: ICD-10-CM

## 2023-09-06 DIAGNOSIS — I26.99 PULMONARY EMBOLISM, UNSPECIFIED CHRONICITY, UNSPECIFIED PULMONARY EMBOLISM TYPE, UNSPECIFIED WHETHER ACUTE COR PULMONALE PRESENT: ICD-10-CM

## 2023-09-06 PROCEDURE — 93306 TTE W/DOPPLER COMPLETE: CPT

## 2023-09-06 PROCEDURE — 93306 TTE W/DOPPLER COMPLETE: CPT | Mod: 26,,, | Performed by: INTERNAL MEDICINE

## 2023-09-06 PROCEDURE — 93306 ECHO (CUPID ONLY): ICD-10-PCS | Mod: 26,,, | Performed by: INTERNAL MEDICINE

## 2023-09-07 LAB
ASCENDING AORTA: 2.72 CM
AV INDEX (PROSTH): 0.7
AV MEAN GRADIENT: 4 MMHG
AV PEAK GRADIENT: 8 MMHG
AV VALVE AREA BY VELOCITY RATIO: 2.81 CM²
AV VALVE AREA: 2.65 CM²
AV VELOCITY RATIO: 0.75
CV ECHO LV RWT: 0.41 CM
DOP CALC AO PEAK VEL: 1.42 M/S
DOP CALC AO VTI: 29.3 CM
DOP CALC LVOT AREA: 3.8 CM2
DOP CALC LVOT DIAMETER: 2.19 CM
DOP CALC LVOT PEAK VEL: 1.06 M/S
DOP CALC LVOT STROKE VOLUME: 77.56 CM3
DOP CALCLVOT PEAK VEL VTI: 20.6 CM
E WAVE DECELERATION TIME: 192.07 MSEC
E/A RATIO: 0.69
E/E' RATIO: 5.47 M/S
ECHO LV POSTERIOR WALL: 0.88 CM (ref 0.6–1.1)
FRACTIONAL SHORTENING: 28 % (ref 28–44)
INTERVENTRICULAR SEPTUM: 0.93 CM (ref 0.6–1.1)
IVC DIAMETER: 1.74 CM
IVRT: 89.44 MSEC
LA MAJOR: 4.78 CM
LA MINOR: 5 CM
LA WIDTH: 3.1 CM
LEFT ATRIUM SIZE: 3.7 CM
LEFT ATRIUM VOLUME: 47.65 CM3
LEFT INTERNAL DIMENSION IN SYSTOLE: 3.11 CM (ref 2.1–4)
LEFT VENTRICLE DIASTOLIC VOLUME: 84.83 ML
LEFT VENTRICLE SYSTOLIC VOLUME: 38.34 ML
LEFT VENTRICULAR INTERNAL DIMENSION IN DIASTOLE: 4.34 CM (ref 3.5–6)
LEFT VENTRICULAR MASS: 126.12 G
LV LATERAL E/E' RATIO: 5.2 M/S
LV SEPTAL E/E' RATIO: 5.78 M/S
LVOT MG: 2.67 MMHG
LVOT MV: 0.77 CM/S
MV PEAK A VEL: 0.75 M/S
MV PEAK E VEL: 0.52 M/S
MV STENOSIS PRESSURE HALF TIME: 55.7 MS
MV VALVE AREA P 1/2 METHOD: 3.95 CM2
PISA TR MAX VEL: 1.24 M/S
PULM VEIN S/D RATIO: 1.36
PV PEAK D VEL: 0.44 M/S
PV PEAK GRADIENT: 3 MMHG
PV PEAK S VEL: 0.6 M/S
PV PEAK VELOCITY: 0.8 M/S
RA MAJOR: 3.95 CM
RA PRESSURE ESTIMATED: 3 MMHG
RA WIDTH: 3.4 CM
RIGHT VENTRICULAR END-DIASTOLIC DIMENSION: 3.4 CM
RV TB RVSP: 4 MMHG
RV TISSUE DOPPLER FREE WALL SYSTOLIC VELOCITY 1 (APICAL 4 CHAMBER VIEW): 16.39 CM/S
SINUS: 2.71 CM
STJ: 2.45 CM
TDI LATERAL: 0.1 M/S
TDI SEPTAL: 0.09 M/S
TDI: 0.1 M/S
TR MAX PG: 6 MMHG
TRICUSPID ANNULAR PLANE SYSTOLIC EXCURSION: 2.5 CM
TV REST PULMONARY ARTERY PRESSURE: 9 MMHG

## 2023-09-26 ENCOUNTER — OFFICE VISIT (OUTPATIENT)
Dept: CARDIOLOGY | Facility: CLINIC | Age: 28
End: 2023-09-26
Payer: MEDICAID

## 2023-09-26 VITALS
HEART RATE: 105 BPM | WEIGHT: 293 LBS | HEIGHT: 61 IN | OXYGEN SATURATION: 97 % | RESPIRATION RATE: 15 BRPM | DIASTOLIC BLOOD PRESSURE: 72 MMHG | BODY MASS INDEX: 55.32 KG/M2 | SYSTOLIC BLOOD PRESSURE: 100 MMHG

## 2023-09-26 DIAGNOSIS — E66.01 SEVERE OBESITY (BMI >= 40): ICD-10-CM

## 2023-09-26 DIAGNOSIS — I26.99 PULMONARY EMBOLISM, UNSPECIFIED CHRONICITY, UNSPECIFIED PULMONARY EMBOLISM TYPE, UNSPECIFIED WHETHER ACUTE COR PULMONALE PRESENT: ICD-10-CM

## 2023-09-26 DIAGNOSIS — I82.431 ACUTE DEEP VEIN THROMBOSIS (DVT) OF POPLITEAL VEIN OF RIGHT LOWER EXTREMITY: Primary | ICD-10-CM

## 2023-09-26 PROCEDURE — 3074F SYST BP LT 130 MM HG: CPT | Mod: CPTII,,, | Performed by: INTERNAL MEDICINE

## 2023-09-26 PROCEDURE — 99999 PR PBB SHADOW E&M-EST. PATIENT-LVL IV: CPT | Mod: PBBFAC,,, | Performed by: INTERNAL MEDICINE

## 2023-09-26 PROCEDURE — 1159F PR MEDICATION LIST DOCUMENTED IN MEDICAL RECORD: ICD-10-PCS | Mod: CPTII,,, | Performed by: INTERNAL MEDICINE

## 2023-09-26 PROCEDURE — 3008F PR BODY MASS INDEX (BMI) DOCUMENTED: ICD-10-PCS | Mod: CPTII,,, | Performed by: INTERNAL MEDICINE

## 2023-09-26 PROCEDURE — 3074F PR MOST RECENT SYSTOLIC BLOOD PRESSURE < 130 MM HG: ICD-10-PCS | Mod: CPTII,,, | Performed by: INTERNAL MEDICINE

## 2023-09-26 PROCEDURE — 1160F RVW MEDS BY RX/DR IN RCRD: CPT | Mod: CPTII,,, | Performed by: INTERNAL MEDICINE

## 2023-09-26 PROCEDURE — 99214 OFFICE O/P EST MOD 30 MIN: CPT | Mod: S$PBB,,, | Performed by: INTERNAL MEDICINE

## 2023-09-26 PROCEDURE — 99214 OFFICE O/P EST MOD 30 MIN: CPT | Mod: PBBFAC,PO | Performed by: INTERNAL MEDICINE

## 2023-09-26 PROCEDURE — 99999 PR PBB SHADOW E&M-EST. PATIENT-LVL IV: ICD-10-PCS | Mod: PBBFAC,,, | Performed by: INTERNAL MEDICINE

## 2023-09-26 PROCEDURE — 3008F BODY MASS INDEX DOCD: CPT | Mod: CPTII,,, | Performed by: INTERNAL MEDICINE

## 2023-09-26 PROCEDURE — 99214 PR OFFICE/OUTPT VISIT, EST, LEVL IV, 30-39 MIN: ICD-10-PCS | Mod: S$PBB,,, | Performed by: INTERNAL MEDICINE

## 2023-09-26 PROCEDURE — 3078F DIAST BP <80 MM HG: CPT | Mod: CPTII,,, | Performed by: INTERNAL MEDICINE

## 2023-09-26 PROCEDURE — 3078F PR MOST RECENT DIASTOLIC BLOOD PRESSURE < 80 MM HG: ICD-10-PCS | Mod: CPTII,,, | Performed by: INTERNAL MEDICINE

## 2023-09-26 PROCEDURE — 1160F PR REVIEW ALL MEDS BY PRESCRIBER/CLIN PHARMACIST DOCUMENTED: ICD-10-PCS | Mod: CPTII,,, | Performed by: INTERNAL MEDICINE

## 2023-09-26 PROCEDURE — 1159F MED LIST DOCD IN RCRD: CPT | Mod: CPTII,,, | Performed by: INTERNAL MEDICINE

## 2023-09-26 NOTE — PROGRESS NOTES
CARDIOVASCULAR CONSULTATION    REASON FOR CONSULT:   Loren Jensen is a 27 y.o. female who presents for evaluation    HISTORY OF PRESENT ILLNESS:     Patient is a pleasant 27-year-old lady.  Recently came in with pulmonary embolism.  Underwent pulmonary thrombectomy.  Feeling much better.  Breathing back to baseline.  States had started taking oral contraceptive pills a few weeks prior to her episode of pulmonary embolism.      Notes from August 23: Patient here for follow-up.  Doing fine.  Denies any chest pains at rest on exertion, orthopnea, PND      Notes from September 23:  Patient here for follow-up.  Echo showed normalization of RV function and pressures.  Patient doing fine.  Denies any chest pains at rest on exertion, orthopnea,.        Left Ventricle: There is normal systolic function with a visually estimated ejection fraction of 60 - 65%.    Right Ventricle: Normal right ventricular cavity size. Systolic function is normal.    IVC/SVC: Normal venous pressure at 3 mmHg.      PAST MEDICAL HISTORY:     Past Medical History:   Diagnosis Date    Mental disorder        PAST SURGICAL HISTORY:     Past Surgical History:   Procedure Laterality Date    RIGHT HEART CATHETERIZATION Right 4/19/2023    Procedure: INSERTION, CATHETER, RIGHT HEART;  Surgeon: Nicolas Carrasquillo MD;  Location: [facility] CATH LAB;  Service: Cardiology;  Laterality: Right;    THROMBECTOMY N/A 4/19/2023    Procedure: THROMBECTOMY;  Surgeon: Nicolas Carrasquillo MD;  Location: [facility] CATH LAB;  Service: Cardiology;  Laterality: N/A;       ALLERGIES AND MEDICATION:     Review of patient's allergies indicates:   Allergen Reactions    Iodinated contrast media Swelling    Abilify [aripiprazole]         Medication List            Accurate as of September 26, 2023  1:15 PM. If you have any questions, ask your nurse or doctor.                CONTINUE taking these medications      albuterol 90 mcg/actuation inhaler  Commonly known as: PROVENTIL/VENTOLIN HFA  Inhale  1-2 puffs into the lungs every 6 (six) hours as needed for Shortness of Breath. Rescue     * ELIQUIS 5 mg Tab  Generic drug: apixaban  Take 2 tablets (10 mg total) by mouth 2 (two) times daily for 6 days, THEN 1 tablet (5 mg total) 2 (two) times daily.  Start taking on: April 21, 2023     * apixaban 5 mg Tab  Commonly known as: ELIQUIS  Take 1 tablet (5 mg total) by mouth 2 (two) times daily.     QUEtiapine 400 MG Tb24  Commonly known as: SEROQUEL XR           * This list has 2 medication(s) that are the same as other medications prescribed for you. Read the directions carefully, and ask your doctor or other care provider to review them with you.                  SOCIAL HISTORY:     Social History     Socioeconomic History    Marital status: Single   Tobacco Use    Smoking status: Every Day     Current packs/day: 0.25     Average packs/day: 0.3 packs/day for 4.0 years (1.0 ttl pk-yrs)     Types: Cigarettes    Smokeless tobacco: Never    Tobacco comments:     2 packs per week   Substance and Sexual Activity    Alcohol use: Not Currently    Drug use: Not Currently    Sexual activity: Yes     Birth control/protection: OCP       FAMILY HISTORY:     Family History   Problem Relation Age of Onset    Breast cancer Maternal Aunt     Colon cancer Neg Hx     Ovarian cancer Neg Hx        REVIEW OF SYSTEMS:   Review of Systems   Constitutional: Negative.   HENT: Negative.     Eyes: Negative.    Cardiovascular: Negative.    Respiratory: Negative.     Endocrine: Negative.    Hematologic/Lymphatic: Negative.    Skin: Negative.    Musculoskeletal: Negative.    Gastrointestinal: Negative.    Genitourinary: Negative.    Neurological: Negative.    Psychiatric/Behavioral: Negative.     Allergic/Immunologic: Negative.        A 10 point review of systems was performed and all the pertinent positives have been mentioned. Rest of review of systems was negative.        PHYSICAL EXAM:     Vitals:    09/26/23 1259   BP: 100/72   Pulse: 105  "  Resp: 15    Body mass index is 60.69 kg/m².  Weight: (!) 145.7 kg (321 lb 3.4 oz)   Height: 5' 1" (154.9 cm)     Physical Exam  Constitutional:       Appearance: Normal appearance. She is well-developed.   HENT:      Head: Normocephalic.   Eyes:      Pupils: Pupils are equal, round, and reactive to light.   Cardiovascular:      Rate and Rhythm: Normal rate and regular rhythm.   Pulmonary:      Effort: Pulmonary effort is normal.      Breath sounds: Normal breath sounds.   Abdominal:      General: Bowel sounds are normal.      Palpations: Abdomen is soft.      Tenderness: There is no abdominal tenderness.   Musculoskeletal:         General: Normal range of motion.      Cervical back: Normal range of motion and neck supple.   Skin:     General: Skin is warm.   Neurological:      Mental Status: She is alert and oriented to person, place, and time.           DATA:     Laboratory:  CBC:  Recent Labs   Lab 04/19/23  1113 04/20/23  0304   WBC 9.42 5.44   Hemoglobin 14.0 12.3   Hematocrit 41.5 36.0 L   Platelets 176 161         CHEMISTRIES:  Recent Labs   Lab 04/19/23  1113 04/20/23  0304 04/21/23  0411   Glucose 113 H 152 H 99   Sodium 139 141 142   Potassium 3.9 3.7 3.6   BUN 8 6 11   Creatinine 0.9 0.8 0.8   Calcium 9.9 8.1 L 8.6 L   Magnesium  --  1.4 L 2.0         CARDIAC BIOMARKERS:        COAGS:  Recent Labs   Lab 04/19/23  1500   INR 1.0         LIPIDS/LFTS:  Recent Labs   Lab 04/19/23  1113   AST 20   ALT 20         No results found for: "HGBA1C"    TSH  Recent Labs   Lab 04/19/23  1113   TSH 1.680         The ASCVD Risk score (Shumway DK, et al., 2019) failed to calculate for the following reasons:    The 2019 ASCVD risk score is only valid for ages 40 to 79       BNP    Lab Results   Component Value Date/Time    BNP 91 04/19/2023 11:13 AM             ASSESSMENT AND PLAN     Patient Active Problem List   Diagnosis    Overdose    Major depressive disorder, recurrent episode, severe, without mention of psychotic " behavior    Borderline personality disorder    Suicide attempt by substance overdose    Adverse reaction to birth control pills    Severe obesity (BMI >= 40)    Acute deep vein thrombosis (DVT) of popliteal vein of right lower extremity    Morbid obesity    Pulmonary embolism       Patient with recent episode of pulmonary embolism.  Likely provoked secondary to initiating oral contraceptive pills a few weeks prior to this episode.  Status post thrombectomy.  Doing much better.  Continue Eliquis 5 milligrams b.i.d. for at least 6 months.  Patient to follow-up with Hematology and Oncology to determine final duration of therapy.  Patient has been told not to take anymore oral contraceptive pills, but to practice other forms of contraception.  Patient understands.    Repeat echo shows normalization of RV size and function.    Follow-up in 6 months          Thank you very much for involving me in the care of your patient.  Please do not hesitate to contact me if there are any questions.      Nicolas Carrasquillo MD, FACC, [facility]  Interventional Cardiologist, [facility].           This note was dictated with the help of speech recognition software.  There might be un-intended errors and/or substitutions.

## 2023-09-27 ENCOUNTER — OFFICE VISIT (OUTPATIENT)
Dept: HEMATOLOGY/ONCOLOGY | Facility: CLINIC | Age: 28
End: 2023-09-27
Payer: MEDICAID

## 2023-09-27 ENCOUNTER — LAB VISIT (OUTPATIENT)
Dept: LAB | Facility: HOSPITAL | Age: 28
End: 2023-09-27
Attending: INTERNAL MEDICINE
Payer: MEDICAID

## 2023-09-27 VITALS
OXYGEN SATURATION: 98 % | WEIGHT: 293 LBS | RESPIRATION RATE: 18 BRPM | HEIGHT: 61 IN | TEMPERATURE: 98 F | HEART RATE: 92 BPM | BODY MASS INDEX: 55.32 KG/M2 | DIASTOLIC BLOOD PRESSURE: 61 MMHG | SYSTOLIC BLOOD PRESSURE: 130 MMHG

## 2023-09-27 DIAGNOSIS — R06.02 SHORTNESS OF BREATH: ICD-10-CM

## 2023-09-27 DIAGNOSIS — I26.99 PULMONARY EMBOLISM, UNSPECIFIED CHRONICITY, UNSPECIFIED PULMONARY EMBOLISM TYPE, UNSPECIFIED WHETHER ACUTE COR PULMONALE PRESENT: ICD-10-CM

## 2023-09-27 DIAGNOSIS — I82.431 ACUTE DEEP VEIN THROMBOSIS (DVT) OF POPLITEAL VEIN OF RIGHT LOWER EXTREMITY: ICD-10-CM

## 2023-09-27 DIAGNOSIS — I82.431 ACUTE DEEP VEIN THROMBOSIS (DVT) OF POPLITEAL VEIN OF RIGHT LOWER EXTREMITY: Primary | ICD-10-CM

## 2023-09-27 PROCEDURE — 3078F DIAST BP <80 MM HG: CPT | Mod: CPTII,,, | Performed by: INTERNAL MEDICINE

## 2023-09-27 PROCEDURE — 85610 PROTHROMBIN TIME: CPT | Performed by: INTERNAL MEDICINE

## 2023-09-27 PROCEDURE — 86147 CARDIOLIPIN ANTIBODY EA IG: CPT | Mod: 59 | Performed by: INTERNAL MEDICINE

## 2023-09-27 PROCEDURE — 1159F MED LIST DOCD IN RCRD: CPT | Mod: CPTII,,, | Performed by: INTERNAL MEDICINE

## 2023-09-27 PROCEDURE — 85300 ANTITHROMBIN III ACTIVITY: CPT | Performed by: INTERNAL MEDICINE

## 2023-09-27 PROCEDURE — 99999 PR PBB SHADOW E&M-EST. PATIENT-LVL IV: ICD-10-PCS | Mod: PBBFAC,,, | Performed by: INTERNAL MEDICINE

## 2023-09-27 PROCEDURE — 85613 RUSSELL VIPER VENOM DILUTED: CPT | Performed by: INTERNAL MEDICINE

## 2023-09-27 PROCEDURE — 3008F BODY MASS INDEX DOCD: CPT | Mod: CPTII,,, | Performed by: INTERNAL MEDICINE

## 2023-09-27 PROCEDURE — 85670 THROMBIN TIME PLASMA: CPT | Performed by: INTERNAL MEDICINE

## 2023-09-27 PROCEDURE — 81240 F2 GENE: CPT | Performed by: INTERNAL MEDICINE

## 2023-09-27 PROCEDURE — 3075F SYST BP GE 130 - 139MM HG: CPT | Mod: CPTII,,, | Performed by: INTERNAL MEDICINE

## 2023-09-27 PROCEDURE — 1159F PR MEDICATION LIST DOCUMENTED IN MEDICAL RECORD: ICD-10-PCS | Mod: CPTII,,, | Performed by: INTERNAL MEDICINE

## 2023-09-27 PROCEDURE — 99204 OFFICE O/P NEW MOD 45 MIN: CPT | Mod: S$PBB,,, | Performed by: INTERNAL MEDICINE

## 2023-09-27 PROCEDURE — 3008F PR BODY MASS INDEX (BMI) DOCUMENTED: ICD-10-PCS | Mod: CPTII,,, | Performed by: INTERNAL MEDICINE

## 2023-09-27 PROCEDURE — 1160F PR REVIEW ALL MEDS BY PRESCRIBER/CLIN PHARMACIST DOCUMENTED: ICD-10-PCS | Mod: CPTII,,, | Performed by: INTERNAL MEDICINE

## 2023-09-27 PROCEDURE — 3075F PR MOST RECENT SYSTOLIC BLOOD PRESS GE 130-139MM HG: ICD-10-PCS | Mod: CPTII,,, | Performed by: INTERNAL MEDICINE

## 2023-09-27 PROCEDURE — 81241 F5 GENE: CPT | Performed by: INTERNAL MEDICINE

## 2023-09-27 PROCEDURE — 85732 THROMBOPLASTIN TIME PARTIAL: CPT | Performed by: INTERNAL MEDICINE

## 2023-09-27 PROCEDURE — 99999 PR PBB SHADOW E&M-EST. PATIENT-LVL IV: CPT | Mod: PBBFAC,,, | Performed by: INTERNAL MEDICINE

## 2023-09-27 PROCEDURE — 99214 OFFICE O/P EST MOD 30 MIN: CPT | Mod: PBBFAC | Performed by: INTERNAL MEDICINE

## 2023-09-27 PROCEDURE — 86148 ANTI-PHOSPHOLIPID ANTIBODY: CPT | Mod: 91 | Performed by: INTERNAL MEDICINE

## 2023-09-27 PROCEDURE — 86147 CARDIOLIPIN ANTIBODY EA IG: CPT | Performed by: INTERNAL MEDICINE

## 2023-09-27 PROCEDURE — 99204 PR OFFICE/OUTPT VISIT, NEW, LEVL IV, 45-59 MIN: ICD-10-PCS | Mod: S$PBB,,, | Performed by: INTERNAL MEDICINE

## 2023-09-27 PROCEDURE — 1160F RVW MEDS BY RX/DR IN RCRD: CPT | Mod: CPTII,,, | Performed by: INTERNAL MEDICINE

## 2023-09-27 PROCEDURE — 86146 BETA-2 GLYCOPROTEIN ANTIBODY: CPT | Mod: 59 | Performed by: INTERNAL MEDICINE

## 2023-09-27 PROCEDURE — 3078F PR MOST RECENT DIASTOLIC BLOOD PRESSURE < 80 MM HG: ICD-10-PCS | Mod: CPTII,,, | Performed by: INTERNAL MEDICINE

## 2023-09-27 NOTE — PROGRESS NOTES
CONSULT NOTE    Subjective:       Patient ID: Loren Jensen is a 27 y.o. female.  MRN: [id number]  : 1995    Chief Complaint: DVT/PE    History of Present Illness:   Loren Jensen is a 27 y.o. female who is referred for DVT/PE      Reports shortness of breath and dizziness and palpitations on exertion for a few days. Thought symptoms were related to anxiety. However, symptoms progressed and she went to the ER, found to have extensive b/l  PE and RLE DVT. Had no pain at the time but does recall right calf pain a few months prior.     Was on OCPs at that time, now stopped. Had been on them since the age of 14.   Was also smoking at the time and has stopped completely now.     She required pulmonary thrombectomy. Feels much better. On apixaban, tolerating it well.     No family history of DVT/PE.    Anxiety is worse as her mother passed away recently. Is following up with her psychiatrist and is on a waiting list for a therapist. On Seroquel. Had to be taken off of Lamictal and was told there is an interaction with apixaban.       Oncology History:  23  CTA  Impression:     1.  Extensive bilateral acute appearing pulmonary emboli are noted. The most central emboli are noted within the main right and left pulmonary arteries, encroaching within 3.5 cm of the main pulmonary artery bifurcation on the left. Additional bilateral lobar, inter lobar, segmental, and subsegmental emboli are noted.  2. Flattening of the interventricular septum raising the possibility of right heart strain.    US RLE  Impression:     Positive DVT in the right popliteal vein, right ATV, and right peroneal vein.       History:  Past Medical History:   Diagnosis Date    Mental disorder       Past Surgical History:   Procedure Laterality Date    RIGHT HEART CATHETERIZATION Right 2023    Procedure: INSERTION, CATHETER, RIGHT HEART;  Surgeon: Nicolas Carrasquillo MD;  Location: [facility] CATH LAB;   "Service: Cardiology;  Laterality: Right;    THROMBECTOMY N/A 4/19/2023    Procedure: THROMBECTOMY;  Surgeon: Nicolas Carrasquillo MD;  Location: [facility] CATH LAB;  Service: Cardiology;  Laterality: N/A;     Family History   Problem Relation Age of Onset    Breast cancer Maternal Aunt     Colon cancer Neg Hx     Ovarian cancer Neg Hx       Social History     Tobacco Use    Smoking status: Every Day     Current packs/day: 0.25     Average packs/day: 0.3 packs/day for 4.0 years (1.0 ttl pk-yrs)     Types: Cigarettes    Smokeless tobacco: Never    Tobacco comments:     2 packs per week   Substance and Sexual Activity    Alcohol use: Not Currently    Drug use: Not Currently    Sexual activity: Yes     Birth control/protection: OCP        ROS:   Review of Systems   Constitutional:  Negative for fever, malaise/fatigue and weight loss.   HENT:  Negative for congestion, hearing loss, nosebleeds and sore throat.    Eyes:  Negative for double vision and photophobia.   Respiratory:  Negative for cough, hemoptysis, sputum production, shortness of breath and wheezing.    Cardiovascular:  Negative for chest pain, palpitations, orthopnea and leg swelling.   Gastrointestinal:  Negative for abdominal pain, blood in stool, constipation, diarrhea, heartburn, nausea and vomiting.   Genitourinary:  Negative for dysuria, hematuria and urgency.   Musculoskeletal:  Negative for back pain, joint pain and myalgias.   Skin:  Negative for itching and rash.   Neurological:  Negative for dizziness, tingling, seizures, weakness and headaches.   Endo/Heme/Allergies:  Negative for polydipsia. Does not bruise/bleed easily.   Psychiatric/Behavioral:  Negative for depression and memory loss. The patient is nervous/anxious. The patient does not have insomnia.         Objective:     Vitals:    09/27/23 1309   BP: 130/61   Pulse: 92   Resp: 18   Temp: 97.8 °F (36.6 °C)   TempSrc: Oral   SpO2: 98%   Weight: (!) 146.4 kg (322 lb 12.1 oz)   Height: 5' 1" (1.549 m) "   PainSc: 0-No pain       Physical Examination:   Physical Exam  Vitals and nursing note reviewed.   Constitutional:       General: She is not in acute distress.     Appearance: She is not diaphoretic.   HENT:      Head: Normocephalic.      Mouth/Throat:      Pharynx: No oropharyngeal exudate.   Eyes:      General: No scleral icterus.     Conjunctiva/sclera: Conjunctivae normal.   Neck:      Thyroid: No thyromegaly.   Cardiovascular:      Rate and Rhythm: Normal rate and regular rhythm.      Heart sounds: Normal heart sounds. No murmur heard.  Pulmonary:      Effort: Pulmonary effort is normal. No respiratory distress.      Breath sounds: No stridor. No wheezing or rales.   Chest:      Chest wall: No tenderness.   Abdominal:      General: Bowel sounds are normal. There is no distension.      Palpations: Abdomen is soft. There is no mass.      Tenderness: There is no abdominal tenderness. There is no rebound.   Musculoskeletal:         General: No tenderness or deformity. Normal range of motion.      Cervical back: Neck supple.   Lymphadenopathy:      Cervical: No cervical adenopathy.   Skin:     General: Skin is warm and dry.      Findings: No erythema or rash.   Neurological:      Mental Status: She is alert and oriented to person, place, and time.      Cranial Nerves: No cranial nerve deficit.      Coordination: Coordination normal.      Gait: Gait is intact.   Psychiatric:         Mood and Affect: Affect normal.         Cognition and Memory: Memory normal.         Judgment: Judgment normal.          Diagnostic Tests:  Significant Imaging: I have reviewed and interpreted all pertinent imaging results/findings.      Laboratory Data:  All pertinent labs have been reviewed.    Labs:   Lab Results   Component Value Date    WBC 5.44 04/20/2023    HGB 12.3 04/20/2023    HCT 36.0 (L) 04/20/2023    MCV 81 (L) 04/20/2023     04/20/2023       Assessment/Plan:   Acute deep vein thrombosis (DVT) of popliteal vein of  right lower extremity    First episode of likely provoked but life threatening PE. Transient risk factors including OCP and smoking have been removed.   Continue anti coagulation, check hypercoagulable work up.   Low threshed for continuing long term anticoagulation given severity of symptoms.   -     Cardiolipin antibody; Future; Expected date: 09/27/2023  -     Cardiolipin antibody, IgA; Future; Expected date: 09/27/2023  -     Antithrombin III; Future; Expected date: 09/27/2023  -     DRVVT; Future; Expected date: 09/27/2023  -     Factor 5 leiden; Future; Expected date: 09/27/2023  -     Prothrombin gene mutation; Future; Expected date: 09/27/2023  -     Beta-2 glycoprotein antibodies; Future; Expected date: 09/27/2023  -     Phosphatidylserine Ab (IgA,IgG,IgM); Future; Expected date: 09/27/2023    Shortness of breath  -     Ambulatory referral/consult to Hematology / Oncology    Pulmonary embolism, unspecified chronicity, unspecified pulmonary embolism type, unspecified whether acute cor pulmonale present  -     Ambulatory referral/consult to Hematology / Oncology       ECOG SCORE    1 - Restricted in strenuous activity-ambulatory and able to carry out work of a light nature           Discussion:   No follow-ups on file.    Plan was discussed with the patient at length, and she verbalized understanding. Loren was given an opportunity to ask questions that were answered to her satisfaction, and she was advised to call in the interval if any problems or questions arise.    Electronically signed by Iram Valencia MD    Route Chart for Scheduling    Med Onc Chart Routing      Follow up with physician 4 months.   Follow up with [facility]    Infusion scheduling note    Injection scheduling note    Labs   Scheduling:  Preferred lab:  Lab interval:  All labs today   Imaging    Pharmacy appointment    Other referrals

## 2023-09-28 LAB — AT III ACT/NOR PPP CHRO: 116 % (ref 83–118)

## 2023-10-01 LAB
APTT IMM NP PPP: 42 SEC (ref 32–48)
APTT P HEP NEUT PPP: ABNORMAL SEC (ref 32–48)
CONFIRM APTT STACLOT: ABNORMAL
DRVVT SCREEN TO CONFIRM RATIO: ABNORMAL RATIO
LA 3 SCREEN W REFLEX-IMP: ABNORMAL
LA APTT+DRVVT+PT W REFLEX PPP: ABNORMAL
MIXING DRVVT: ABNORMAL SEC (ref 33–44)
PROTHROMBIN TIME: 17.2 SEC (ref 12–15.5)
REPTILASE TIME: ABNORMAL SEC
SCREEN APTT: 53 SEC (ref 32–48)
SCREEN DRVVT: 41 SEC (ref 33–44)
THROMBIN TIME: 19.4 SEC (ref 14.7–19.5)

## 2023-10-02 LAB
B2 GLYCOPROT1 IGA SER QL: 0.9 U/ML
B2 GLYCOPROT1 IGG SER QL: <0.8 U/ML
B2 GLYCOPROT1 IGM SER QL: <2.4 U/ML
CARDIOLIPIN IGA SER IA-ACNC: 1.2 APL
CARDIOLIPIN IGG SER IA-ACNC: <9.4 GPL (ref 0–14.99)
CARDIOLIPIN IGM SER IA-ACNC: <9.4 MPL (ref 0–12.49)
F2 C.20210G>A GENO BLD/T: NEGATIVE
F5 P.R506Q BLD/T QL: NEGATIVE

## 2023-10-19 LAB
PS IGA SER-ACNC: 0 APS (ref 0–19)
PS IGG SER-ACNC: 3 GPS (ref 0–15)
PS IGM SER-ACNC: 1 MPS (ref 0–21)

## 2023-12-04 PROBLEM — I26.99 PULMONARY EMBOLISM: Status: RESOLVED | Noted: 2023-08-29 | Resolved: 2023-12-04

## 2024-02-16 ENCOUNTER — PATIENT MESSAGE (OUTPATIENT)
Dept: HEMATOLOGY/ONCOLOGY | Facility: CLINIC | Age: 29
End: 2024-02-16
Payer: MEDICAID

## 2024-03-26 NOTE — ED NOTES
Pt ambulated to restroom with urine cup   Refill request received from pharmacy      Next Office Visit Date:  Future Appointments   Date Time Provider Department Center   4/24/2024  2:30 PM Aleja Lieberman MD [facility]   6/13/2024 11:00 AM Aleja Lieberman MD [facility] - Please review via PDMP        Last Office Visit:    2/28/2024

## 2024-04-16 ENCOUNTER — TELEPHONE (OUTPATIENT)
Dept: HEMATOLOGY/ONCOLOGY | Facility: CLINIC | Age: 29
End: 2024-04-16
Payer: MEDICAID

## 2024-04-16 NOTE — TELEPHONE ENCOUNTER
Attempted to reach patient, no answer, left voicemail for her to call back to schedule her recall.

## (undated) DEVICE — GUIDEWIRE AMPLATZ .035X260 SS

## (undated) DEVICE — SHEATH INTRI24 INTRO 24FR 33CM

## (undated) DEVICE — DEVICE PERCLOSE SUT CLSR 6FR

## (undated) DEVICE — KIT MANIFOLD LOW PRESS TUBING

## (undated) DEVICE — KIT INTRODUCER MICROPUNCTR 4F

## (undated) DEVICE — WIRE GUIDE SAFE-T-J .035 260CM

## (undated) DEVICE — INTRODUCER PINNACLE .035X8X10C

## (undated) DEVICE — KIT SYR REUSABLE

## (undated) DEVICE — KIT HAND CONTROL HIGH PRESSUR

## (undated) DEVICE — GUIDEWIRE TORQUE 3CM/260CML

## (undated) DEVICE — CATH JR4 125CM 5FR

## (undated) DEVICE — CATH PULMONARY WEDGE PRESS MON

## (undated) DEVICE — FILTER FLOWSAVER BLOOD RETURN

## (undated) DEVICE — Device

## (undated) DEVICE — OMNIPAQUE 350MG 150ML VIAL

## (undated) DEVICE — PAD DEFIB CADENCE ADULT R2

## (undated) DEVICE — CATH TRIEVER24 OTW 24FR 95CM